# Patient Record
Sex: FEMALE | Race: WHITE | NOT HISPANIC OR LATINO | Employment: FULL TIME | URBAN - METROPOLITAN AREA
[De-identification: names, ages, dates, MRNs, and addresses within clinical notes are randomized per-mention and may not be internally consistent; named-entity substitution may affect disease eponyms.]

---

## 2018-02-09 ENCOUNTER — OFFICE VISIT CONVERTED (OUTPATIENT)
Dept: PULMONOLOGY | Facility: CLINIC | Age: 28
End: 2018-02-09
Attending: INTERNAL MEDICINE

## 2018-08-30 ENCOUNTER — OFFICE VISIT CONVERTED (OUTPATIENT)
Dept: PULMONOLOGY | Facility: CLINIC | Age: 28
End: 2018-08-30
Attending: PHYSICIAN ASSISTANT

## 2018-09-20 ENCOUNTER — OFFICE VISIT CONVERTED (OUTPATIENT)
Dept: PULMONOLOGY | Facility: CLINIC | Age: 28
End: 2018-09-20
Attending: INTERNAL MEDICINE

## 2019-01-17 ENCOUNTER — OFFICE VISIT CONVERTED (OUTPATIENT)
Dept: PULMONOLOGY | Facility: CLINIC | Age: 29
End: 2019-01-17
Attending: INTERNAL MEDICINE

## 2021-05-28 VITALS
HEART RATE: 104 BPM | OXYGEN SATURATION: 97 % | OXYGEN SATURATION: 100 % | DIASTOLIC BLOOD PRESSURE: 80 MMHG | HEIGHT: 66 IN | TEMPERATURE: 98.4 F | HEIGHT: 66 IN | RESPIRATION RATE: 12 BRPM | WEIGHT: 172.56 LBS | BODY MASS INDEX: 26.57 KG/M2 | BODY MASS INDEX: 27.32 KG/M2 | TEMPERATURE: 98.3 F | WEIGHT: 170 LBS | WEIGHT: 165.31 LBS | RESPIRATION RATE: 18 BRPM | DIASTOLIC BLOOD PRESSURE: 82 MMHG | SYSTOLIC BLOOD PRESSURE: 132 MMHG | HEART RATE: 87 BPM | RESPIRATION RATE: 14 BRPM | HEART RATE: 94 BPM | SYSTOLIC BLOOD PRESSURE: 120 MMHG | TEMPERATURE: 98.3 F | HEIGHT: 66 IN | DIASTOLIC BLOOD PRESSURE: 75 MMHG | BODY MASS INDEX: 27.73 KG/M2 | OXYGEN SATURATION: 100 % | SYSTOLIC BLOOD PRESSURE: 111 MMHG

## 2021-05-28 VITALS
DIASTOLIC BLOOD PRESSURE: 80 MMHG | HEART RATE: 98 BPM | RESPIRATION RATE: 16 BRPM | OXYGEN SATURATION: 100 % | SYSTOLIC BLOOD PRESSURE: 124 MMHG | WEIGHT: 166.12 LBS | HEIGHT: 66 IN | TEMPERATURE: 98.5 F | BODY MASS INDEX: 26.7 KG/M2

## 2021-05-28 NOTE — PROGRESS NOTES
Patient: RBO SEYMOUR     Acct: [id number]     Report: #[id number]  UNIT #: [id number]     : 1990    Encounter Date:2018  PRIMARY CARE: ANA ALTMAN  ***Signed***  --------------------------------------------------------------------------------------------------------------------  Chief Complaint      Encounter Date      2018            Referring Provider      ANA ALTMAN            Patient Complaint      Patient is complaining of      Pt here for 2m f/u and results            VITALS      Height 5 ft 6 in / 167.64 cm      Weight 165 lbs 5 oz / 74.205908 kg      BSA 1.88 m2      BMI 26.7 kg/m2      Temperature 98.3 F / 36.83 C - Oral      Pulse 94      Respirations 14      Blood Pressure 132/82 Sitting, Right Arm      Pulse Oximetry 97%, Room air            HPI      The patient is a very pleasant 27 year old white female with history of POTS     and asthma here today for follow up.             The patient was started on Symbicort at her last office visit and feels this     has significantly helped with her symptoms. The patient no longer needs to use     her rescue inhalers often. She has much less shortness of breath and increased     exercise tolerance. The patient has some dizziness and some heart palpitations     which she attributes to the POT symptoms as well as some fatigue. The patient     has no sleep apnea type symptoms and has a normal [place] sleepiness scale     score. The patient still has some shortness of breath worse with exertion,     worse with certain types of fragrances and some episodic wheezing,  better with     rest.            ROS      Constitutional:  Denies: Fatigue, Fever, Weight gain, Weight loss, Chills,     Insomnia, Other      Respiratory/Breathing:  Complains of: Shortness of air, Denies: Wheezing, Cough    , Hemoptysis, Pleuritic pain, Other      Endocrine:  Denies: Polydipsia, Polyuria, Heat/cold intolerance, Abnorml     menstrual pattern,  Diabetes, Other      Eyes:  Denies: Blurred vision, Vision Changes, Other      Ears, nose, mouth, throat:  Denies: Mouth lesions, Thrush, Throat pain,     Hoarseness, Allergies/Hay Fever, Post Nasal Drip, Headaches, Recent Head Injury    , Nose Bleeding, Neck Stiffness, Thyroid Mass, Hearing Loss, Ear Fullness, Dry     Mouth, Nasal or Sinus Pain, Dry Lips, Nasal discharge, Nasal congestion, Other      Cardiovascular:  Denies: Palpitations, Syncope, Claudication, Chest Pain, Wake     up Gasping for air, Leg Swelling, Irregular Heart Rate, Cyanosis, Dyspnea on     Exertion, Other      Gastrointestinal:  Denies: Nausea, Constipation, Diarrhea, Abdominal pain,     Vomiting, Difficulty Swallowing, Reflux/Heartburn, Dysphagia, Jaundice, Bloating    , Melena, Bloody stools, Other      Genitourinary:  Denies: Urinary frequency, Incontinence, Hematuria, Urgency,     Nocturia, Dysuria, Testicular problems, Other      Musculoskeletal:  Denies: Joint Pain, Joint Stiffness, Joint Swelling, Myalgias    , Other      Hematologic/lymphatic:  DENIES: Lymphadenopathy, Bruising, Bleeding tendencies,     Other      Neurological:  Denies: Headache, Numbness, Weakness, Seizures, Other      Psychiatric:  Denies: Anxiety, Appropriate Effect, Depression, Other      Sleep:  No: Excessive daytime sleep, Morning Headache?, Snoring, Insomnia?,     Stop breathing at sleep?, Other      Integumentary:  Denies: Rash, Dry skin, Skin Warm to Touch, Other      Immunologic/Allergic:  Denies: Latex allergy, Seasonal allergies, Asthma,     Urticaria, Eczema, Other      Immunization status:  No: Up to date            FAMILY/SOCIAL/MEDICAL HX      Surgical History:  Yes: Abdominal Surgery (HERNIA REPAIR INGUINAL), No: AAA     Repair, Angioplasty, Appendectomy, Back Surgery, Bladder Surgery, Bowel Surgery    , Breast Surgery, CABG, Carotid Stenosis, Cholecystectomy, Ear Surgery, Eye     Surgery, Head Surgery, Hernia Surgery, Kidney Surgery, Nose Surgery,  Oral     Surgery, Orthopedic Surgery, Prostatectomy, Rectal Surgery, Spinal Surgery,     Testicular Surgery, Throat Surgery, Valve Replacement, Vascular Surgery, Other     Surgeries      Heart - Family Hx:  Grandparent      Cancer/Type - Family Hx:  Grandparent      Is Father Still Living?:  Yes      Is Mother Still Living?:  Yes      Social History:  No Tobacco Use, No Alcohol Use, No Recreational Drug use      Smoking status:  Never smoker      Anticoagulation Therapy:  No      Antibiotic Prophylaxis:  No      Medical History:  No: Alcoholism, Allergies, Anemia, Arthritis, Asthma, Blood     Disease, Broken Bones, Cataracts, Chemical Dependency, Chemotherapy/Cancer,     Chronic Bronchitis/COPD, Emphysema, Chronic Liver Disease, Colon Trouble,     Colitis, Diverticulitis, Congestive Heart Failu, Deafness or Ringing Ears,     Convulsions, Depression, Anxiety, Bipolar Disorder, Diabetes, Epilepsy, Seizures    , Forgetfullness, Glaucoma, Gall Stones, Gout, Head Injury, Heart Attack, Heart     Murmur, Hemorrhoids/Rectal Prob, Hepatitis, Hiatal Hernia, High Blood Pressure,     High Cholesterol, HIV (Do not ask - volu, Jaundice, Kidney or Bladder Disease,     Kidney Stones, Migrane Headaches, Mitral Valve Prolapse, Night sweats, Phlebitis    , Psychiatric Care, Reflux Disease, Rheumatic Fever, Sexually Transmitted Dis,     Shortness Of Breath, Sinus Trouble, Skin Disease/Psoriais/Ecz, Stroke, Thyroid     Problem, Tuberculosis or Pos TB Te, Miscellaneous Medical/oth            Hx Influenza Vaccination:  No      Influenza Vaccine Declined:  Yes      2 or More Falls Past Year?:  No      Fall Past Year with Injury?:  No      Hx Pneumococcal Vaccination:  No      Encouraged to follow-up with:  PCP regarding preventative exams.      Chart initiated by      Saundra De La Torre MA            ALLERGIES/MEDICATIONS      Allergies:        Coded Allergies:             NO KNOWN ALLERGIES (Unverified , 2/9/18)      Medications    Last  Reconciled on 2/9/18 15:53 by HECTOR BRIGGS MD      Budesonide/Formoterol Fumarate (Symbicort 160/4.5 Mcg) 10.2 Gm Inh      2 PUFF INH RTBID, #1 INH 0 Refills         Reported         2/9/18       Fluticasone Furoate (Arnuity Ellipta) 100 Mcg Blst.w.dev      1 PUFF INH RTQDAY, #1 INH         Reported         7/20/17       verapamil Hcl (verapamil*) 80 Mg Tab      40 MG PO QDAY, #45 TAB 0 Refills         Reported         5/18/17       Montelukast Sodium (Singulair*) 10 Mg Tablet      10 MG PO QDAY, #30 TAB 0 Refills         Reported         1/17/17       (Magnesium)   No Conflict Check      400 MG PO QAM         Reported         1/23/15       Multivitamins (Multiple Vitamin) 1 Tab Tablet      1 TAB PO QDAY, #30 TAB 0 Refills         Reported         1/23/15      Current Medications      Current Medications Reviewed 2/9/18            EXAM      GEN-patient appears stated age resting comfortable in no acute distress      Eyes-PERRL,  conjunctiva are normal in appearance extraocular muscles are intact    , no scleral icterus      Lymphatic-no swollen or enlarged cervical nodes, or axillary node, or femoral     nodes, or supraclavicular nodes      Mouth normal dentition, no erythema no ulcerations oropharynx appears normal no     exudate no evidence of postnasal drip,       Neck-there are no palpable supraclavicular or cervical adenopathy, thyroid is     normal in appearance no apparent nodules, there is no inspiratory or expiratory     stridor      Respiratory-patient exhibits normal work of breathing, speaking in full     sentences without difficulty, the chest is normal in appearance, clear to     auscultation with no wheezes rales or rhonchi, chest is normal to percussion on     both the right and left sides      Cardiovascular-the heart rate is normal and regular S1 and S2 present with no     murmur or extra heart sounds, there is no JVD or pedal edema present      GI-the abdomen is normal in appearance, bowel  sounds present and normal in all     quadrants no hepatosplenomegaly or masses felt      Extremities-no clubbing is present, pulses present in all extremities,     capillary refill time is normal      Skin-skin is normal in appearance it is warm and dry, no rashes present, no     evidence of cyanosis, palpation reveals no masses      Neurological-the patient is alert and oriented to time place and person, moves     all 4 extremities, normal gait, normal affect and mood, CN2-12 intact      Psych-normal judgment and insight is good, normal mood and affect, alert and     oriented to person, place, and time, and date      Vtials      Vitals:             Height 5 ft 6 in / 167.64 cm           Weight 165 lbs 5 oz / 74.348780 kg           BSA 1.88 m2           BMI 26.7 kg/m2           Temperature 98.3 F / 36.83 C - Oral           Pulse 94           Respirations 14           Blood Pressure 132/82 Sitting, Right Arm           Pulse Oximetry 97%, Room air            REVIEW      Results Reviewed      PCCS Results Reviewed?:  Yes Prev Lab Results, Yes Prev Radiology Results, Yes     Previous [facility] Records            PLAN      Assessment      Notes      New Medications      * TIOTROPIUM BROMIDE (Spiriva Respimat 1.25 mcg/puff) 4 GM MIST.INHAL: 2 PUFFS     INH QDAY #1      * Budesonide/Formoterol Fumarate (Symbicort 160/4.5 Mcg) 10.2 GM INH: 2 PUFF     INH BID #1      * Fluticasone/Vilanterol 200-25 Mcg Inh (Breo Ellipta 200-25 Mcg Inh) 1 EACH     BLST.W.DEV: 1 PUFF INH QDAY #1       Instructions: TO REPLACE SYMBICORT      ASSESSMENT/PLAN:      1. Moderate persistent asthma. Continue Symbicort 160/4.5 two puffs twice daily     with a spacer.       2. Continue PRN albuterol. Symptoms are much improved however still present. We     will add Spiriva 1.25 mcg.       3. Pulmonary hypertension. Not present on repeat echocardiogram.       4. Heart palpitations. Followed by cardiology.       5. Allergic rhinitis. Continue Singulair.        6. I have personally reviewed laboratory data, imaging as well as previous     medical records.            Patient Education      Education resources provided:  Yes      Patient Education Provided:  Acute Asthma                 Disclaimer: Converted document may not contain table formatting or lab diagrams. Please see [email or web address] for the authenticated document.

## 2021-05-28 NOTE — PROGRESS NOTES
Patient: BRO SEYMOUR     Acct: [id number]     Report: #[id number]  UNIT #: [id number]     : 1990    Encounter Date:2019  PRIMARY CARE: ANA ALTMAN  ***Signed***  --------------------------------------------------------------------------------------------------------------------  Chief Complaint      Encounter Date      2019            Primary Care Provider      ANA ALTMAN            Referring Provider      ANA ALTMAN            Patient Complaint      Patient is complaining of      4 MONTH FOLLOW UP/SOA            VITALS      Height 5 ft 6 in / 167.64 cm      Weight 172 lbs 9 oz / 78.409582 kg      BSA 1.88 m2      BMI 27.9 kg/m2      Temperature 98.4 F / 36.89 C - Oral      Pulse 104      Respirations 18      Blood Pressure 120/80 Sitting, Right Arm      Pulse Oximetry 100%, room air            HPI      The patient is a very pleasant 28 year old female with history of asthma here     for follow up.             The patient is doing well at this time. She feels that the Symbicort and Spiriva    are helping. She uses the Spiriva only during her menstrual cycle. She has no     increased cough and no increased sputum production and feels that her breathing     is overall at her baseline.            ROS      Constitutional:  Complains of: Fatigue; Denies: Fever, Weight gain, Weight loss,    Chills, Insomnia, Other      Respiratory/Breathing:  Denies: Shortness of air, Wheezing, Cough, Hemoptysis,     Pleuritic pain, Other      Endocrine:  Denies: Polydipsia, Polyuria, Heat/cold intolerance, Abnorml     menstrual pattern, Diabetes, Other      Eyes:  Denies: Blurred vision, Vision Changes, Other      Ears, nose, mouth, throat:  Denies: Mouth lesions, Thrush, Throat pain,     Hoarseness, Allergies/Hay Fever, Post Nasal Drip, Headaches, Recent Head Injury,    Nose Bleeding, Neck Stiffness, Thyroid Mass, Hearing Loss, Ear Fullness, Dry     Mouth, Nasal or Sinus Pain, Dry Lips, Nasal  discharge, Nasal congestion, Other      Cardiovascular:  Denies: Palpitations, Syncope, Claudication, Chest Pain, Wake     up Gasping for air, Leg Swelling, Irregular Heart Rate, Cyanosis, Dyspnea on     Exertion, Other      Gastrointestinal:  Denies: Nausea, Constipation, Diarrhea, Abdominal pain,     Vomiting, Difficulty Swallowing, Reflux/Heartburn, Dysphagia, Jaundice,     Bloating, Melena, Bloody stools, Other      Genitourinary:  Denies: Urinary frequency, Incontinence, Hematuria, Urgency,     Nocturia, Dysuria, Testicular problems, Other      Musculoskeletal:  Denies: Joint Pain, Joint Stiffness, Joint Swelling, Myalgias,    Other      Hematologic/lymphatic:  DENIES: Lymphadenopathy, Bruising, Bleeding tendencies,     Other      Neurological:  Denies: Headache, Numbness, Weakness, Seizures, Other      Psychiatric:  Denies: Anxiety, Appropriate Effect, Depression, Other      Sleep:  No: Excessive daytime sleep, Morning Headache?, Snoring, Insomnia?, Stop    breathing at sleep?, Other      Integumentary:  Denies: Rash, Dry skin, Skin Warm to Touch, Other      Immunologic/Allergic:  Denies: Latex allergy, Seasonal allergies, Asthma,     Urticaria, Eczema, Other      Immunization status:  No: Up to date            FAMILY/SOCIAL/MEDICAL HX      Surgical History:  Yes: Abdominal Surgery (HERNIA REPAIR INGUINAL); No: AAA     Repair, Angioplasty, Appendectomy, Back Surgery, Bladder Surgery, Bowel Surgery,    Breast Surgery, CABG, Carotid Stenosis, Cholecystectomy, Ear Surgery, Eye     Surgery, Head Surgery, Hernia Surgery, Kidney Surgery, Nose Surgery, Oral     Surgery, Orthopedic Surgery, Prostatectomy, Rectal Surgery, Spinal Surgery,     Testicular Surgery, Throat Surgery, Valve Replacement, Vascular Surgery, Other     Surgeries      Heart - Family Hx:  Grandparent      Cancer/Type - Family Hx:  Grandparent      Is Father Still Living?:  Yes      Is Mother Still Living?:  Yes       Family History:  Yes      Social  History:  No Tobacco Use, No Alcohol Use, No Recreational Drug use      Smoking status:  Never smoker      Anticoagulation Therapy:  No      Antibiotic Prophylaxis:  No      Medical History:  Yes: Anxiety; No: Alcoholism, Allergies, Anemia, Arthritis,     Asthma, Blood Disease, Broken Bones, Cataracts, Chemical Dependency,     Chemotherapy/Cancer, Chronic Bronchitis/COPD, Emphysema, Chronic Liver Disease,     Colon Trouble, Colitis, Diverticulitis, Congestive Heart Failu, Deafness or     Ringing Ears, Convulsions, Depression, Bipolar Disorder, Diabetes, Epilepsy,     Seizures, Forgetfullness, Glaucoma, Gall Stones, Gout, Head Injury, Heart     Attack, Heart Murmur, Hemorrhoids/Rectal Prob, Hepatitis, Hiatal Hernia, High     Blood Pressure, High Cholesterol, HIV (Do not ask - volu, Jaundice, Kidney or     Bladder Disease, Kidney Stones, Migrane Headaches, Mitral Valve Prolapse, Night     sweats, Phlebitis, Psychiatric Care, Reflux Disease, Rheumatic Fever, Sexually     Transmitted Dis, Shortness Of Breath, Sinus Trouble, Skin Disease/Psoriais/Ecz,     Stroke, Thyroid Problem, Tuberculosis or Pos TB Te, Miscellaneous Medical/oth      Psychiatric History      anxiety            PREVENTION      Hx Influenza Vaccination:  No      Influenza Vaccine Declined:  Yes      2 or More Falls Past Year?:  No      Fall Past Year with Injury?:  No      Hx Pneumococcal Vaccination:  No      Encouraged to follow-up with:  PCP regarding preventative exams.      Chart initiated by      GELY LOREDO MA            ALLERGIES/MEDICATIONS      Allergies:        Coded Allergies:             NO KNOWN ALLERGIES (Unverified , 1/17/19)      Medications    Last Reconciled on 1/17/19 11:13 by HECTOR BRIGGS MD      Citalopram HBr (CeleXA) 10 Mg Tablet      10 MG PO QDAY, #30 TAB 0 Refills         Reported         8/30/18       Budesonide/Formoterol Fumarate (Symbicort 160/4.5 Mcg) 10.2 Gm Inh      2 PUFF INH RTBID, #1 INH 0 Refills          Reported         8/30/18       Albuterol (Proair HFA) 8.5 Gm Inh      2 PUFFS INH RTQ6H for 30 Days, #1 INH 3 Refills         Prov: Luis Roland         6/29/18       verapamil Hcl (verapamil*) 80 Mg Tab      40 MG PO QDAY, #45 TAB 0 Refills         Reported         5/18/17       Montelukast Sodium (Singulair*) 10 Mg Tablet      10 MG PO QDAY, #30 TAB 0 Refills         Reported         1/17/17       (Magnesium)   No Conflict Check      400 MG PO QAM         Reported         1/23/15       Multivitamins (Multiple Vitamin) 1 Tab Tablet      1 TAB PO QDAY, #30 TAB 0 Refills         Reported         1/23/15      Current Medications      Current Medications Reviewed 1/17/19            EXAM      GEN-patient appears stated age resting comfortable in no acute distress      Eyes-PERRL,  conjunctiva are normal in appearance extraocular muscles are     intact, no scleral icterus      Lymphatic-no swollen or enlarged cervical nodes, or axillary node, or femoral     nodes, or supraclavicular nodes      Mouth normal dentition, no erythema no ulcerations oropharynx appears normal no     exudate no evidence of postnasal drip,       Neck-there are no palpable supraclavicular or cervical adenopathy, thyroid is     normal in appearance no apparent nodules, there is no inspiratory or expiratory     stridor      Respiratory-patient exhibits normal work of breathing, speaking in full     sentences without difficulty, the chest is normal in appearance, clear to     auscultation with no wheezes rales or rhonchi, chest is normal to percussion on     both the right and left sides      Cardiovascular-the heart rate is normal and regular S1 and S2 present with no     murmur or extra heart sounds, there is no JVD or pedal edema present      GI-the abdomen is normal in appearance, bowel sounds present and normal in all     quadrants no hepatosplenomegaly or masses felt      Extremities-no clubbing is present, pulses present in all extremities,  capillary     refill time is normal      Skin-skin is normal in appearance it is warm and dry, no rashes present, no     evidence of cyanosis, palpation reveals no masses      Neurological-the patient is alert and oriented to time place and person, moves     all 4 extremities, normal gait, normal affect and mood, CN2-12 intact      Psych-normal judgment and insight is good, normal mood and affect, alert and     oriented to person, place, and time, and date      Vtials      Vitals:             Height 5 ft 6 in / 167.64 cm           Weight 172 lbs 9 oz / 78.506064 kg           BSA 1.88 m2           BMI 27.9 kg/m2           Temperature 98.4 F / 36.89 C - Oral           Pulse 104           Respirations 18           Blood Pressure 120/80 Sitting, Right Arm           Pulse Oximetry 100%, room air            REVIEW      Results Reviewed      PCCS Results Reviewed?:  Yes Prev Lab Results, Yes Prev Radiology Results, Yes     Previous [facility] Records            Assessment      ASSESSMENT/PLAN:      1. Moderate persistent asthma. Worse during menstrual cycle. Continue current     medications of Symbicort and Qvar during her cycle along with Spiriva.       2. The patient politely declines flu vaccine.       3. We will see the patient back in 6 months.       4. I have personally reviewed laboratory data, imaging as well as previous     medical records..            Patient Education      Education resources provided:  Yes      Patient Education Provided:  Acute Asthma                 Disclaimer: Converted document may not contain table formatting or lab diagrams. Please see "[employer] System for the authenticated document.

## 2021-05-28 NOTE — PROGRESS NOTES
Patient: BRO SEYMOUR     Acct: [id number]     Report: #[id number]  UNIT #: [id number]     : 1990    Encounter Date:2018  PRIMARY CARE: ANA ALTMAN  ***Signed***  --------------------------------------------------------------------------------------------------------------------  Chief Complaint      Encounter Date      Sep 20, 2018            Primary Care Provider      ANA ALTMAN            Referring Provider      AAN ALTMAN            Patient Complaint      Patient is complaining of      Pt here for 6m f/u, Moderate persistent asthma            VITALS      Height 5 ft 6 in / 167.64 cm      Weight 170 lbs 0 oz / 77.196750 kg      BSA 1.91 m2      BMI 27.4 kg/m2      Temperature 98.3 F / 36.83 C - Oral      Pulse 87      Respirations 12      Blood Pressure 111/75 Sitting, Left Arm      Pulse Oximetry 100%, Room air            HPI      The patient is a very pleasant 28-year-old white female with history of asthma     here today for followup. The patient is doing well at this time, still has     worsening of her symptoms of asthma when she is on her menstrual cycle. She has     some chest tightness and some mild chest discomfort. Recent CT scan of the chest    failed to show any endometrial implants in her chest; however, she does have     history of endometriosis. The patient feels that her asthma is very well     controlled on Symbicort when she is not menstruating. She uses her rescue     inhaler very infrequently when she is not menstruating.            ROS      Constitutional:  Denies: Fatigue, Fever, Weight gain, Weight loss, Chills,     Insomnia, Other      Respiratory/Breathing:  Denies: Shortness of air, Wheezing, Cough, Hemoptysis,     Pleuritic pain, Other      Endocrine:  Denies: Polydipsia, Polyuria, Heat/cold intolerance, Abnorml     menstrual pattern, Diabetes, Other      Eyes:  Denies: Blurred vision, Vision Changes, Other      Ears, nose, mouth, throat:  Denies:  Mouth lesions, Thrush, Throat pain, H    oarseness, Allergies/Hay Fever, Post Nasal Drip, Headaches, Recent Head Injury,     Nose Bleeding, Neck Stiffness, Thyroid Mass, Hearing Loss, Ear Fullness, Dry     Mouth, Nasal or Sinus Pain, Dry Lips, Nasal discharge, Nasal congestion, Other      Cardiovascular:  Denies: Palpitations, Syncope, Claudication, Chest Pain, Wake     up Gasping for air, Leg Swelling, Irregular Heart Rate, Cyanosis, Dyspnea on     Exertion, Other      Gastrointestinal:  Denies: Nausea, Constipation, Diarrhea, Abdominal pain,     Vomiting, Difficulty Swallowing, Reflux/Heartburn, Dysphagia, Jaundice,     Bloating, Melena, Bloody stools, Other      Genitourinary:  Denies: Urinary frequency, Incontinence, Hematuria, Urgency,     Nocturia, Dysuria, Testicular problems, Other      Musculoskeletal:  Denies: Joint Pain, Joint Stiffness, Joint Swelling, Myalgias,    Other      Hematologic/lymphatic:  DENIES: Lymphadenopathy, Bruising, Bleeding tendencies,     Other      Neurological:  Denies: Headache, Numbness, Weakness, Seizures, Other      Psychiatric:  Denies: Anxiety, Appropriate Effect, Depression, Other      Sleep:  No: Excessive daytime sleep, Morning Headache?, Snoring, Insomnia?, Stop    breathing at sleep?, Other      Integumentary:  Denies: Rash, Dry skin, Skin Warm to Touch, Other      Immunologic/Allergic:  Complains of: Asthma; Denies: Latex allergy, Seasonal     allergies, Urticaria, Eczema, Other      Immunization status:  No: Up to date            FAMILY/SOCIAL/MEDICAL HX      Surgical History:  Yes: Abdominal Surgery (HERNIA REPAIR INGUINAL); No: AAA     Repair, Angioplasty, Appendectomy, Back Surgery, Bladder Surgery, Bowel Surgery,    Breast Surgery, CABG, Carotid Stenosis, Cholecystectomy, Ear Surgery, Eye     Surgery, Head Surgery, Hernia Surgery, Kidney Surgery, Nose Surgery, Oral     Surgery, Orthopedic Surgery, Prostatectomy, Rectal Surgery, Spinal Surgery,     Testicular  Surgery, Throat Surgery, Valve Replacement, Vascular Surgery, Other     Surgeries      Heart - Family Hx:  Grandparent      Cancer/Type - Family Hx:  Grandparent      Is Father Still Living?:  Yes      Is Mother Still Living?:  Yes       Family History:  Yes      Social History:  No Tobacco Use, No Alcohol Use, No Recreational Drug use      Smoking status:  Never smoker      Anticoagulation Therapy:  No      Antibiotic Prophylaxis:  No      Medical History:  Yes: Anxiety; No: Alcoholism, Allergies, Anemia, Arthritis,     Asthma, Blood Disease, Broken Bones, Cataracts, Chemical Dependency,     Chemotherapy/Cancer, Chronic Bronchitis/COPD, Emphysema, Chronic Liver Disease,     Colon Trouble, Colitis, Diverticulitis, Congestive Heart Failu, Deafness or     Ringing Ears, Convulsions, Depression, Bipolar Disorder, Diabetes, Epilepsy,     Seizures, Forgetfullness, Glaucoma, Gall Stones, Gout, Head Injury, Heart     Attack, Heart Murmur, Hemorrhoids/Rectal Prob, Hepatitis, Hiatal Hernia, High     Blood Pressure, High Cholesterol, HIV (Do not ask - volu, Jaundice, Kidney or     Bladder Disease, Kidney Stones, Migrane Headaches, Mitral Valve Prolapse, Night     sweats, Phlebitis, Psychiatric Care, Reflux Disease, Rheumatic Fever, Sexually     Transmitted Dis, Shortness Of Breath, Sinus Trouble, Skin Disease/Psoriais/Ecz,     Stroke, Thyroid Problem, Tuberculosis or Pos TB Te, Miscellaneous Medical/oth      Psychiatric History      Anxiety            PREVENTION      Hx Influenza Vaccination:  No      Influenza Vaccine Declined:  Yes      2 or More Falls Past Year?:  No      Fall Past Year with Injury?:  No      Hx Pneumococcal Vaccination:  No      Encouraged to follow-up with:  PCP regarding preventative exams.      Chart initiated by      Saundra Florian MA            ALLERGIES/MEDICATIONS      Allergies:        Coded Allergies:             NO KNOWN ALLERGIES (Unverified , 9/20/18)      Medications    Last Reconciled on  9/20/18 08:43 by HECTOR ROLAND MD      Citalopram HBr (CeleXA) 10 Mg Tablet      10 MG PO QDAY, #30 TAB 0 Refills         Reported         8/30/18       Budesonide/Formoterol Fumarate (Symbicort 160/4.5 Mcg) 10.2 Gm Inh      2 PUFF INH RTBID, #1 INH 0 Refills         Reported         8/30/18       Albuterol (Proair HFA*) 8.5 Gm Inh      2 PUFFS INH RTQ6H for 30 Days, #1 INH 3 Refills         Prov: Hector Roland         6/29/18       verapamil Hcl (verapamil*) 80 Mg Tab      40 MG PO QDAY, #45 TAB 0 Refills         Reported         5/18/17       Montelukast Sodium (Singulair*) 10 Mg Tablet      10 MG PO QDAY, #30 TAB 0 Refills         Reported         1/17/17       (Magnesium)   No Conflict Check      400 MG PO QAM         Reported         1/23/15       Multivitamins (Multiple Vitamin) 1 Tab Tablet      1 TAB PO QDAY, #30 TAB 0 Refills         Reported         1/23/15      Current Medications      Current Medications Reviewed 9/20/18            EXAM      GEN-patient appears stated age resting comfortable in no acute distress      Eyes-PERRL,  conjunctiva are normal in appearance extraocular muscles are     intact, no scleral icterus      Lymphatic-no swollen or enlarged cervical nodes, or axillary node, or femoral     nodes, or supraclavicular nodes      Mouth normal dentition, no erythema no ulcerations oropharynx appears normal no     exudate no evidence of postnasal drip,       Neck-there are no palpable supraclavicular or cervical adenopathy, thyroid is     normal in appearance no apparent nodules, there is no inspiratory or expiratory     stridor      Respiratory-patient exhibits normal work of breathing, speaking in full     sentences without difficulty, the chest is normal in appearance, clear to     auscultation with no wheezes rales or rhonchi, chest is normal to percussion on     both the right and left sides      Cardiovascular-the heart rate is normal and regular S1 and S2 present with no     murmur  or extra heart sounds, there is no JVD or pedal edema present      GI-the abdomen is normal in appearance, bowel sounds present and normal in all     quadrants no hepatosplenomegaly or masses felt      Extremities-no clubbing is present, pulses present in all extremities, capillary     refill time is normal      Skin-skin is normal in appearance it is warm and dry, no rashes present, no     evidence of cyanosis, palpation reveals no masses      Neurological-the patient is alert and oriented to time place and person, moves     all 4 extremities, normal gait, normal affect and mood, CN2-12 intact      Psych-normal judgment and insight is good, normal mood and affect, alert and     oriented to person, place, and time, and date      Vtials      Vitals:             Height 5 ft 6 in / 167.64 cm           Weight 170 lbs 0 oz / 77.900838 kg           BSA 1.91 m2           BMI 27.4 kg/m2           Temperature 98.3 F / 36.83 C - Oral           Pulse 87           Respirations 12           Blood Pressure 111/75 Sitting, Left Arm           Pulse Oximetry 100%, Room air            REVIEW      Results Reviewed      [facility] Results Reviewed?:  Yes Prev Lab Results, Yes Prev Radiology Results, Yes     Previous [facility] Records            Assessment      Notes      Discontinued Medications      * Fluticasone/Vilanterol 200-25 Mcg Inh (Breo Ellipta 200-25 Mcg Inh) 1 EACH       BLST.W.DEV: 1 PUFF INH QDAY #1         Instructions: TO REPLACE SYMBICORT      ASSESSMENT:      1. Moderate persistent asthma, worse during menstrual cycle.      2. Dyspnea.      3. Pleuritic chest pain with her menstrual cycle.            PLAN:       1. We will add cyclic Qvar to be taken a few days before her cycle and through     her cycle in conjunction with her Symbicort.      2. The patient at this time does not wish to try oral contraceptives. I have     explained to her that oral contraceptives may be beneficial in controlling her     symptoms.      3. It  does not appear that the patient has any obvious endometrial implants on     CT scan of the chest; however, the scan was not obtained during her menstrual     cycle and sometimes endometrial implants can be very subtle.      4. The patient declines flu vaccine.       5. I have personally reviewed laboratory data, imaging as well as previous     medical records.            Patient Education      Education resources provided:  Yes      Patient Education Provided:  Acute Asthma                 Disclaimer: Converted document may not contain table formatting or lab diagrams. Please see [employer] for the authenticated document.

## 2021-05-28 NOTE — PROGRESS NOTES
Patient: BRO SEYMOUR     Acct: [id number]     Report: #[id number]  UNIT #: [id number]     : 1990    Encounter Date:2018  PRIMARY CARE: ANA ALTMAN  ***Signed***  --------------------------------------------------------------------------------------------------------------------  Chief Complaint      Encounter Date      Aug 30, 2018            Primary Care Provider      ANA ALTMAN            Referring Provider      ANA ALTMAN            Patient Complaint      Patient is complaining of      left lung pain            VITALS      Height 5 ft 6 in / 167.64 cm      Weight 166 lbs 2 oz / 75.551572 kg      BSA 1.89 m2      BMI 26.8 kg/m2      Temperature 98.5 F / 36.94 C - Oral      Pulse 98      Respirations 16      Blood Pressure 124/80 Sitting, Right Arm      Pulse Oximetry 100%, room air            HPI      The patient is a very pleasant 28 year old white female who is a patient of Dr. Lira.  She has a history of mild persistent asthma as well as POTS.  She is    here today complaining of symptoms of pleuritic type chest pain in the middle of    her chest and underneath her left breast associated with dyspnea and a feeling      of not being able to take a full breath out that has been ongoing typically for     several days a month during her menstrual cycle for at least six months to one     year now. She tells me that the symptoms have worsened in the past several     months and that the symptoms this month that are ongoing currently are worse     than they have ever been before. She denies any coughing, hemoptysis, fever,     chills or any purulent sputum production.  She is still using her Symbicort 160     and has tried that along with her Ventolin rescue inhaler for these symptoms     with no relief. She denies any orthopnea, lower extremity edema or weight gain.     She does have a history of stage 4 endometriosis and has actually had to have     three surgeries for  endometrial tissue removal.  As far as she knows she has had    endometriosis throughout her pelvis and on her ovaries in the past.  She tells     me that she recently had a colonoscopy secondary to some spasm type abdominal     pain, but was told that she did not appear to have endometrial tissue on her     colon at that time.  She currently is not on oral contraceptives for her     endometriosis as she had migraines associated with those in the past. Her     symptoms are severe enough that she wants to stay in bed all day when they     happen and are severely impacting her quality of life.  The symptoms tend to     occur on the first day of her menstrual period and last for 3-5 days.            I have reviewed her ROS, medical, surgical and family history and agree with     those as entered in the chart.            ROS      Constitutional:  Denies: Fatigue, Fever, Weight gain, Weight loss, Chills,     Insomnia, Other      Respiratory/Breathing:  Complains of: Shortness of air; Denies: Wheezing, Cough,    Hemoptysis, Pleuritic pain, Other      Endocrine:  Denies: Polydipsia, Polyuria, Heat/cold intolerance, Abnorml     menstrual pattern, Diabetes, Other      Eyes:  Denies: Blurred vision, Vision Changes, Other      Ears, nose, mouth, throat:  Denies: Mouth lesions, Thrush, Throat pain,     Hoarseness, Allergies/Hay Fever, Post Nasal Drip, Headaches, Recent Head Injury,    Nose Bleeding, Neck Stiffness, Thyroid Mass, Hearing Loss, Ear Fullness, Dry     Mouth, Nasal or Sinus Pain, Dry Lips, Nasal discharge, Nasal congestion, Other      Cardiovascular:  Denies: Palpitations, Syncope, Claudication, Chest Pain, Wake     up Gasping for air, Leg Swelling, Irregular Heart Rate, Cyanosis, Dyspnea on     Exertion, Other      Gastrointestinal:  Denies: Nausea, Constipation, Diarrhea, Abdominal pain,     Vomiting, Difficulty Swallowing, Reflux/Heartburn, Dysphagia, Jaundice,     Bloating, Melena, Bloody stools, Other       Genitourinary:  Denies: Urinary frequency, Incontinence, Hematuria, Urgency,     Nocturia, Dysuria, Testicular problems, Other      Musculoskeletal:  Denies: Joint Pain, Joint Stiffness, Joint Swelling, Myalgias,    Other      Hematologic/lymphatic:  DENIES: Lymphadenopathy, Bruising, Bleeding tendencies,     Other      Neurological:  Denies: Headache, Numbness, Weakness, Seizures, Other      Psychiatric:  Denies: Anxiety, Appropriate Effect, Depression, Other      Sleep:  No: Excessive daytime sleep, Morning Headache?, Snoring, Insomnia?, Stop    breathing at sleep?, Other      Integumentary:  Denies: Rash, Dry skin, Skin Warm to Touch, Other      Immunologic/Allergic:  Denies: Latex allergy, Seasonal allergies, Asthma,     Urticaria, Eczema, Other      Immunization status:  No: Up to date            FAMILY/SOCIAL/MEDICAL HX      Surgical History:  Yes: Abdominal Surgery (HERNIA REPAIR INGUINAL); No: AAA     Repair, Angioplasty, Appendectomy, Back Surgery, Bladder Surgery, Bowel Surgery,    Breast Surgery, CABG, Carotid Stenosis, Cholecystectomy, Ear Surgery, Eye Surger    y, Head Surgery, Hernia Surgery, Kidney Surgery, Nose Surgery, Oral Surgery,     Orthopedic Surgery, Prostatectomy, Rectal Surgery, Spinal Surgery, Testicular     Surgery, Throat Surgery, Valve Replacement, Vascular Surgery, Other Surgeries      Heart - Family Hx:  Grandparent      Cancer/Type - Family Hx:  Grandparent      Is Father Still Living?:  Yes      Is Mother Still Living?:  Yes       Family History:  Yes      Social History:  No Tobacco Use, No Alcohol Use, No Recreational Drug use      Smoking status:  Never smoker      Anticoagulation Therapy:  No      Antibiotic Prophylaxis:  No      Medical History:  No: Alcoholism, Allergies, Anemia, Arthritis, Asthma, Blood     Disease, Broken Bones, Cataracts, Chemical Dependency, Chemotherapy/Cancer,     Chronic Bronchitis/COPD, Emphysema, Chronic Liver Disease, Colon Trouble,     Colitis,  Diverticulitis, Congestive Heart Failu, Deafness or Ringing Ears,     Convulsions, Depression, Anxiety, Bipolar Disorder, Diabetes, Epilepsy,     Seizures, Forgetfullness, Glaucoma, Gall Stones, Gout, Head Injury, Heart     Attack, Heart Murmur, Hemorrhoids/Rectal Prob, Hepatitis, Hiatal Hernia, High     Blood Pressure, High Cholesterol, HIV (Do not ask - volu, Jaundice, Kidney or     Bladder Disease, Kidney Stones, Migrane Headaches, Mitral Valve Prolapse, Night     sweats, Phlebitis, Psychiatric Care, Reflux Disease, Rheumatic Fever, Sexually     Transmitted Dis, Shortness Of Breath, Sinus Trouble, Skin Disease/Psoriais/Ecz,     Stroke, Thyroid Problem, Tuberculosis or Pos TB Te, Miscellaneous Medical/oth      Psychiatric History      none            PREVENTION      Hx Influenza Vaccination:  No      Influenza Vaccine Declined:  Yes      2 or More Falls Past Year?:  No      Fall Past Year with Injury?:  No      Hx Pneumococcal Vaccination:  No      Encouraged to follow-up with:  PCP regarding preventative exams.      Chart initiated by      eufemia armando ma            ALLERGIES/MEDICATIONS      Allergies:        Coded Allergies:             NO KNOWN ALLERGIES (Unverified , 8/30/18)      Medications    Last Reconciled on 8/30/18 16:31 by YUNG PENA      Citalopram HBr (CeleXA) 10 Mg Tablet      10 MG PO QDAY, #30 TAB 0 Refills         Reported         8/30/18       Budesonide/Formoterol Fumarate (Symbicort 160/4.5 Mcg) 10.2 Gm Inh      2 PUFF INH RTBID, #1 INH 0 Refills         Reported         8/30/18       Albuterol (Proair HFA*) 8.5 Gm Inh      2 PUFFS INH RTQ6H for 30 Days, #1 INH 3 Refills         Prov: Luis Roland         6/29/18       Fluticasone/Vilanterol 200-25 Mcg Inh (Breo Ellipta 200-25 Mcg Inh) 1 Each     Blst.w.dev      1 PUFF INH QDAY, #1 INH 3 Refills         Prov: Luis Roland         2/15/18       verapamil Hcl (verapamil*) 80 Mg Tab      40 MG PO QDAY, #45 TAB 0 Refills          Reported         5/18/17       Montelukast Sodium (Singulair*) 10 Mg Tablet      10 MG PO QDAY, #30 TAB 0 Refills         Reported         1/17/17       (Magnesium)   No Conflict Check      400 MG PO QAM         Reported         1/23/15       Multivitamins (Multiple Vitamin) 1 Tab Tablet      1 TAB PO QDAY, #30 TAB 0 Refills         Reported         1/23/15      Current Medications      Current Medications Reviewed 8/30/18            EXAM      GEN-patient appears stated age resting comfortable in no acute distress      Eyes-PERRL,  conjunctiva are normal in appearance extraocular muscles are     intact, no scleral icterus      Nasal-both nares are patent turbinates appear normal no polyps seen no nasal     discharge or ulcerations      Ears-tympanic membranes are normal no erythema no bulging, normal to inspection      Lymphatic-no swollen or enlarged cervical nodes, or axillary node, or femoral     nodes, or supraclavicular nodes      Mouth normal dentition, no erythema no ulcerations oropharynx appears normal no     exudate no evidence of postnasal drip, MP(default value)      Neck-there are no palpable supraclavicular or cervical adenopathy, thyroid is     normal in appearance no apparent nodules, there is no inspiratory or expiratory     stridor      Respiratory-Lungs are grossly clear to auscultation.  No wheezes, rhonchi or     crackles appreciated.  Normal work of breathing.        Cardiovascular-the heart rate is normal and regular S1 and S2 present with no     murmur or extra heart sounds, there is no JVD or pedal edema present      GI-the abdomen is normal in appearance, bowel sounds present and normal in all     quadrants no hepatosplenomegaly or masses felt      Extremities-no clubbing is present, pulses present in all extremities, capillary    refill time is normal      Musculoskeletal-Normal strength in upper and lower extremities, inspection shows    no evidence of muscle atrophy      Skin-skin is  normal in appearance it is warm and dry, no rashes present, no     evidence of cyanosis, palpation reveals no masses      Neurological-the patient is alert and oriented to time place and person, moves     all 4 extremities, normal gait, normal affect and mood, CN2-12 intact      Psych-normal judgment and insight is good, normal mood and affect, alert and     oriented to person, place, and time, and date      Vtials      Vitals:             Height 5 ft 6 in / 167.64 cm           Weight 166 lbs 2 oz / 75.337722 kg           BSA 1.89 m2           BMI 26.8 kg/m2           Temperature 98.5 F / 36.94 C - Oral           Pulse 98           Respirations 16           Blood Pressure 124/80 Sitting, Right Arm           Pulse Oximetry 100%, room air            REVIEW      Results Reviewed      PCCS Results Reviewed?:  Yes Prev Lab Results, Yes Prev Radiology Results, Yes     Previous [facility] Records      Lab Results      I personally reviewed previous lab work, imaging and provider notes including     recent ER records from [facility] from three days ago at which     time the patient had a normal chest x-ray, normal EKG and normal blood work     including D-dimer that was negative and negative troponins.  I have also     reviewed previous chest CT that she had done at [facility] in 2017    that was grossly within normal limits.            Assessment      SOB (shortness of breath) - R06.02            Pleuritic chest pain - R07.81            Endometriosis - N80.9            Notes      New Medications      * Budesonide/Formoterol Fumarate (Symbicort 160/4.5 Mcg) 10.2 GM INH: 2 PUFF INH      RTBID #1      * Citalopram HBr (CeleXA) 10 MG TABLET: 10 MG PO QDAY #30      Discontinued Medications      * TIOTROPIUM BROMIDE (Spiriva Respimat 1.25 mcg/puff) 4 GM MIST.INHAL: 2 PUFFS       INH QDAY #1      New Diagnostics      * Chest W/O Cont CT, As Soon As Possible         Dx: SOB (shortness of breath) - R06.02       ASSESSMENT:       1. Pleuritic chest pain and dyspnea associated with patient's menstrual cycle,     concern for endometriosis to the lung or diaphragm.        2. Moderate persistent asthma without acute exacerbation.      3. Pleuritic chest pain.      4.  Dyspnea.      5. POTS syndrome.      6.  Allergic rhinitis.      7. Endometriosis, stage 4.              PLAN:      1.  I would like to have patient to have a repeat chest CT, this time with     contrast as her symptoms of pleuritic chest pain and dyspnea have worsened     significantly that occur during her menstrual cycle over the past 6-12 months si    nce her previous chest CT was done. I would like a chest CT with contrast this     time to see if any endometrial lesions on her lungs can be appreciated.  I have     also requested records from the patient's gynecologist, Dr. Mina in     [place], [place] who has been treating her endometriosis and has done her previous     endometrial surgeries. The patient has already had significant cardiac and     pulmonary workup for these symptoms of her pleuritic chest pain and dyspnea that    occur during her menses and thus far all workup has been negative including     negative chest x-ray, EKG, troponins, D-dimer, CBC and BMP.  She also had a     normal echocardiogram in 12/2017 and is actually scheduled for a repeat     echocardiogram tomorrow. I have asked the patient to have those records sent to     us so we can review them as well.        2.  I will have her continue on Symbicort 160 with two puffs twice a day.  Her     current symptoms do not appear secondary to her asthma.        3. I will review her chest CT with contrast once it is available.  Pending those    results, may need to discuss with the patient and/or gynecologist, Dr. Mina    about considering placing her back on oral contraceptives to better treat her     endometriosis given that she is having significant symptoms that are impacting     her  daily life during her menstrual period.        4.  Follow up in 1-2 months with Dr. Roland and I have also discussed my     concerns with him today. He is in agreement with this plan and these findings as    well as the suspected diagnosis of endometriosis to the lung.            Patient Education      Patient Education Provided:  How to use an Inhaler      Time Spent:  > 50% /[facility]                 Disclaimer: Converted document may not contain table formatting or lab diagrams. Please see [employer] for the authenticated document.

## 2022-06-27 ENCOUNTER — TELEPHONE (OUTPATIENT)
Dept: UROLOGY | Facility: CLINIC | Age: 32
End: 2022-06-27

## 2022-06-27 NOTE — TELEPHONE ENCOUNTER
SPOKE TO DYANA AT ANA ALTMAN'S OFFICE TO INFORM OF NEW PT NO SHOW WITH ELIOT ON 6/27 IN [place]

## 2022-08-12 ENCOUNTER — TELEPHONE (OUTPATIENT)
Dept: UROLOGY | Facility: CLINIC | Age: 32
End: 2022-08-12

## 2022-09-19 ENCOUNTER — OFFICE VISIT (OUTPATIENT)
Dept: UROLOGY | Facility: CLINIC | Age: 32
End: 2022-09-19

## 2022-09-19 VITALS — RESPIRATION RATE: 16 BRPM | BODY MASS INDEX: 22.5 KG/M2 | WEIGHT: 140 LBS | HEIGHT: 66 IN

## 2022-09-19 DIAGNOSIS — N39.0 RECURRENT UTI: Primary | ICD-10-CM

## 2022-09-19 PROBLEM — N76.0 ACUTE VAGINITIS: Status: ACTIVE | Noted: 2022-09-19

## 2022-09-19 PROBLEM — IMO0002 ENDOMETRIOSIS OF PELVIS: Status: ACTIVE | Noted: 2022-09-19

## 2022-09-19 PROBLEM — R06.02 SHORTNESS OF BREATH: Status: ACTIVE | Noted: 2018-08-31

## 2022-09-19 PROBLEM — Q79.62 EHLERS-DANLOS SYNDROME TYPE III: Status: ACTIVE | Noted: 2018-06-22

## 2022-09-19 PROBLEM — D49.7 PITUITARY NEOPLASM: Status: ACTIVE | Noted: 2022-09-19

## 2022-09-19 PROBLEM — R07.9 CHEST PAIN: Status: ACTIVE | Noted: 2018-08-31

## 2022-09-19 PROBLEM — G90.9 DISORDER OF AUTONOMIC NERVOUS SYSTEM: Status: ACTIVE | Noted: 2018-06-22

## 2022-09-19 PROBLEM — Z71.2 ENCOUNTER TO DISCUSS TEST RESULTS: Status: ACTIVE | Noted: 2018-08-31

## 2022-09-19 PROBLEM — B37.31 CANDIDIASIS OF VAGINA: Status: ACTIVE | Noted: 2022-09-19

## 2022-09-19 PROBLEM — R00.0 SINUS TACHYCARDIA: Status: ACTIVE | Noted: 2018-06-22

## 2022-09-19 PROBLEM — R10.11 RIGHT UPPER QUADRANT PAIN: Status: ACTIVE | Noted: 2022-09-19

## 2022-09-19 PROBLEM — K21.9 GASTROESOPHAGEAL REFLUX DISEASE: Status: ACTIVE | Noted: 2022-09-19

## 2022-09-19 PROBLEM — H52.00 HYPERMETROPIA: Status: ACTIVE | Noted: 2022-09-19

## 2022-09-19 PROBLEM — G90.A POSTURAL ORTHOSTATIC TACHYCARDIA SYNDROME: Status: ACTIVE | Noted: 2018-01-18

## 2022-09-19 PROBLEM — R42 DISEQUILIBRIUM: Status: ACTIVE | Noted: 2018-06-22

## 2022-09-19 PROBLEM — I27.20 PULMONARY HYPERTENSION (HCC): Status: ACTIVE | Noted: 2018-08-31

## 2022-09-19 LAB
BILIRUB BLD-MCNC: NEGATIVE MG/DL
CLARITY, POC: CLEAR
COLOR UR: YELLOW
EXPIRATION DATE: NORMAL
GLUCOSE UR STRIP-MCNC: NEGATIVE MG/DL
KETONES UR QL: NEGATIVE
LEUKOCYTE EST, POC: NEGATIVE
Lab: NORMAL
NITRITE UR-MCNC: NEGATIVE MG/ML
PH UR: 7 [PH] (ref 5–8)
PROT UR STRIP-MCNC: NEGATIVE MG/DL
RBC # UR STRIP: NEGATIVE /UL
SP GR UR: 1.01 (ref 1–1.03)
URINE VOLUME: NORMAL
UROBILINOGEN UR QL: NORMAL

## 2022-09-19 PROCEDURE — 51798 US URINE CAPACITY MEASURE: CPT | Performed by: NURSE PRACTITIONER

## 2022-09-19 PROCEDURE — 99204 OFFICE O/P NEW MOD 45 MIN: CPT | Performed by: NURSE PRACTITIONER

## 2022-09-19 PROCEDURE — 87086 URINE CULTURE/COLONY COUNT: CPT | Performed by: NURSE PRACTITIONER

## 2022-09-19 RX ORDER — DOXYCYCLINE HYCLATE 100 MG
TABLET ORAL
COMMUNITY
Start: 2022-09-15 | End: 2022-11-07

## 2022-09-19 RX ORDER — VERAPAMIL HYDROCHLORIDE 40 MG/1
40 TABLET ORAL DAILY
COMMUNITY
Start: 2017-09-18

## 2022-09-19 RX ORDER — CITALOPRAM 10 MG/1
TABLET ORAL
COMMUNITY
Start: 2017-09-18

## 2022-09-19 RX ORDER — TAMSULOSIN HYDROCHLORIDE 0.4 MG/1
1 CAPSULE ORAL DAILY
Qty: 90 CAPSULE | Refills: 3 | Status: SHIPPED | OUTPATIENT
Start: 2022-09-19 | End: 2022-10-19

## 2022-09-19 NOTE — PROGRESS NOTES
Chief Complaint: Recurrent UTI    Subjective         History of Present Illness  Patricia Olea is a 32 y.o. female presents to [facility] to be seen for current UTIs.    She has seen a previous urologist and had a stone removed.     She states that she has a UTI every month around menses.     She states she has dysuria and feeling of incomplete emptying.     Denies straining to void.    She is on doxycyline for  UTI with staph within the last week.     She does have a HX of Bacterial vaginosis.     Not related to intercourse.    She had a CT scan done on 9/14/22 which reveals no  Stones or renal cysts. Large right adnexal 4.7x 4.1 cm simple cyst.     She does state some spraying with urination.     She drinks 48 oz of water a day.     She voids not very often.       Previous:  5/2/2022 no growth    10/24/2021 E. coli greater than 100,000 colony-forming units per milliliter pansensitive.    7/2/2021 no growth.    6/24/2021 E. coli greater than 100,000 colony-forming units per milliliter resistant to ampicillin, levofloxacin, intermittent susceptibility to ampicillin/sulbactam.    5/2/2021 E. coli greater than 100,000 colony-forming units per milliliter distant to ampicillin, intermittent susceptibility to ampicillin/sulbactam.    2/23/2021 E. coli greater than 100,000 colony-forming units per milliliter resistant to ampicillin, intermittent susceptibility to ampicillin/sulbactam.    2/19/2021 no growth.    1/21/2021 E. coli greater than 100,000 colony-forming units per milliliter pansensitive.    Objective     Past Medical History:   Diagnosis Date   • Fibroid     Endometriosis   • HTN (hypertension) 3/4/2015   • Kidney stone 2021   • Pituitary neoplasm 9/19/2022   • Urinary tract infection Long time    Monthly for over 10 years   • Vaginal infection     Bacterial infections       Past Surgical History:   Procedure Laterality Date   • BLADDER SURGERY  Cystoscopy   • HERNIA REPAIR  2013     "Bilateral inguinal   • KIDNEY STONE SURGERY  2021         Current Outpatient Medications:   •  citalopram (CeleXA) 10 MG tablet, citalopram 10 mg tablet, Disp: , Rfl:   •  doxycycline (VIBRAMYICN) 100 MG tablet, , Disp: , Rfl:   •  verapamil (CALAN) 40 MG tablet, verapamil 40 mg tablet, Disp: , Rfl:   •  tamsulosin (FLOMAX) 0.4 MG capsule 24 hr capsule, Take 1 capsule by mouth Daily for 30 days., Disp: 90 capsule, Rfl: 3    Allergies   Allergen Reactions   • Sulfamethoxazole-Trimethoprim Hives        Family History   Problem Relation Age of Onset   • Kidney cancer Maternal Grandfather        Social History     Socioeconomic History   • Marital status: Single   Tobacco Use   • Smoking status: Never Smoker   • Smokeless tobacco: Never Used   Vaping Use   • Vaping Use: Some days   • Substances: Nicotine, Flavoring   Substance and Sexual Activity   • Alcohol use: Yes     Alcohol/week: 6.0 standard drinks     Types: 6 Cans of beer per week     Comment: Monthly   • Drug use: Never   • Sexual activity: Yes     Partners: Male     Birth control/protection: Natural family planning, Partner's vasectomy       Vital Signs:   Resp 16   Ht 167.6 cm (66\")   Wt 63.5 kg (140 lb)   BMI 22.60 kg/m²      Physical Exam     Result Review :   The following data was reviewed by: GIOVANNI Alas on 09/19/2022:  Results for orders placed or performed in visit on 09/19/22   Bladder Scan   Result Value Ref Range    Urine Volume 142ml    POC Urinalysis Dipstick, Automated    Specimen: Urine   Result Value Ref Range    Color Yellow Yellow, Straw, Dark Yellow, Radhika    Clarity, UA Clear Clear    Specific Gravity  1.015 1.005 - 1.030    pH, Urine 7.0 5.0 - 8.0    Leukocytes Negative Negative    Nitrite, UA Negative Negative    Protein, POC Negative Negative mg/dL    Glucose, UA Negative Negative mg/dL    Ketones, UA Negative Negative    Urobilinogen, UA 0.2 E.U./dL Normal, 0.2 E.U./dL    Bilirubin Negative Negative    Blood, UA Negative " Negative    Lot Number [id number]     Expiration Date 82,023         Bladder Scan interpretation 09/19/2022    Estimation of residual urine via "[employer] an [employer]"I 3000 VerOcapion Bladder Scan  MA/nurse performing: Isabel VELIZ MA   Residual Urine: 142 ml  Indication: Recurrent UTI   Position: Supine  Examination: Incremental scanning of the suprapubic area using 2.0 MHz transducer using copious amounts of acoustic gel.   Findings: An anechoic area was demonstrated which represented the bladder, with measurement of residual urine as noted. I inspected this myself. In that the residual urine was insignificant, refer to plan for treatment and plan necessary at this time.           Procedures        Assessment and Plan    Diagnoses and all orders for this visit:    1. Recurrent UTI (Primary)  -     Bladder Scan  -     POC Urinalysis Dipstick, Automated  -     Urine Culture - Urine, Urine, Clean Catch; Future  -     tamsulosin (FLOMAX) 0.4 MG capsule 24 hr capsule; Take 1 capsule by mouth Daily for 30 days.  Dispense: 90 capsule; Refill: 3    Urinary tract infection-no evidence on urine dip of infection today.  Encouraged behavioral modifications including fluid management, hydration, not delaying urgency when she needs to void.  Recommend cranberry supplementation daily to aid with prevention of urinary tract infection.  Patient provided specimen cup and asked to present to office to provide specimen should she have symptoms of urinary tract infection to submit for urine culture.  Discussed with patient the importance of obtaining urine culture prior to treatment with antibiotics for urinary tract infection.  Follow-up in 6 weeks     Recommend vaginal probiotics to promote good jeanne and decrease colonization of inflammatory bacteria; also discussed birth control methods given cyclic nature of UTI monthly in conjunction with her menses; UTI symptoms likely hormonally regulated.        Discussed with patient that chronic recurrent UTI is a  common condition that is multifactorial in nature, difficult to treat, unlikely to completely irradicate, and the specific cause for recurrent infections is often unknown.     Discussed with patient at this point in time given her symptoms I would like to try to place her on tamsulosin to see if this will help to alleviate some of the spraying with urination and help her to empty her bladder completely.        I spent 15 minutes caring for Patricia on this date of service. This time includes time spent by me in the following activities:reviewing tests, obtaining and/or reviewing a separately obtained history, performing a medically appropriate examination and/or evaluation , counseling and educating the patient/family/caregiver, ordering medications, tests, or procedures, and documenting information in the medical record  Follow Up   Return in about 6 weeks (around 10/31/2022) for With Dr. Stuart for f/u recurrent UTI with PVR check .  Patient was given instructions and counseling regarding her condition or for health maintenance advice. Please see specific information pulled into the AVS if appropriate.         This document has been electronically signed by GIOVANNI Alas  September 19, 2022 11:24 EDT

## 2022-09-20 LAB — BACTERIA SPEC AEROBE CULT: NO GROWTH

## 2022-11-09 ENCOUNTER — TELEPHONE (OUTPATIENT)
Dept: UROLOGY | Facility: CLINIC | Age: 32
End: 2022-11-09

## 2022-11-09 NOTE — TELEPHONE ENCOUNTER
PT CX'D HER APPT WITH DR AGUIRRE FOR 11/9, SPOKE TO PT AND SHE STATED THAT SHE DID NOT WANT TO RESCHEDULED HER APPT AT THIS TIME, ANYTHING ELSE TO DO?

## 2023-02-10 ENCOUNTER — TRANSCRIBE ORDERS (OUTPATIENT)
Dept: OBSTETRICS AND GYNECOLOGY | Facility: HOSPITAL | Age: 33
End: 2023-02-10
Payer: COMMERCIAL

## 2023-02-10 DIAGNOSIS — O09.299 HX OF PREECLAMPSIA, PRIOR PREGNANCY, CURRENTLY PREGNANT: ICD-10-CM

## 2023-02-10 DIAGNOSIS — O44.02 COMPLETE PLACENTA PREVIA NOS OR WITHOUT HEMORRHAGE, SECOND TRIMESTER: Primary | ICD-10-CM

## 2023-02-10 DIAGNOSIS — F41.8 MIXED ANXIETY DEPRESSIVE DISORDER: ICD-10-CM

## 2023-02-10 DIAGNOSIS — Z34.90 PREGNANCY, UNSPECIFIED GESTATIONAL AGE: ICD-10-CM

## 2023-02-23 ENCOUNTER — HOSPITAL ENCOUNTER (OUTPATIENT)
Dept: WOMENS IMAGING | Facility: HOSPITAL | Age: 33
Discharge: HOME | End: 2023-02-23
Admitting: NURSE PRACTITIONER
Payer: COMMERCIAL

## 2023-02-23 ENCOUNTER — OFFICE VISIT (OUTPATIENT)
Dept: OBSTETRICS AND GYNECOLOGY | Facility: HOSPITAL | Age: 33
End: 2023-02-23
Payer: COMMERCIAL

## 2023-02-23 VITALS
SYSTOLIC BLOOD PRESSURE: 113 MMHG | DIASTOLIC BLOOD PRESSURE: 71 MMHG | BODY MASS INDEX: 24.59 KG/M2 | HEIGHT: 66 IN | WEIGHT: 153 LBS

## 2023-02-23 DIAGNOSIS — G90.A POSTURAL ORTHOSTATIC TACHYCARDIA SYNDROME: ICD-10-CM

## 2023-02-23 DIAGNOSIS — O09.299 HX OF PREECLAMPSIA, PRIOR PREGNANCY, CURRENTLY PREGNANT: ICD-10-CM

## 2023-02-23 DIAGNOSIS — O44.00 PLACENTA PREVIA ANTEPARTUM: ICD-10-CM

## 2023-02-23 DIAGNOSIS — Z3A.22 22 WEEKS GESTATION OF PREGNANCY: ICD-10-CM

## 2023-02-23 DIAGNOSIS — Z34.90 PREGNANCY, UNSPECIFIED GESTATIONAL AGE: ICD-10-CM

## 2023-02-23 DIAGNOSIS — G90.9 DISORDER OF AUTONOMIC NERVOUS SYSTEM: ICD-10-CM

## 2023-02-23 DIAGNOSIS — F41.8 MIXED ANXIETY DEPRESSIVE DISORDER: ICD-10-CM

## 2023-02-23 DIAGNOSIS — O44.02 COMPLETE PLACENTA PREVIA NOS OR WITHOUT HEMORRHAGE, SECOND TRIMESTER: ICD-10-CM

## 2023-02-23 DIAGNOSIS — D49.7 PITUITARY NEOPLASM: Primary | ICD-10-CM

## 2023-02-23 DIAGNOSIS — Q79.62 EHLERS-DANLOS SYNDROME TYPE III: ICD-10-CM

## 2023-02-23 PROCEDURE — 76811 OB US DETAILED SNGL FETUS: CPT

## 2023-02-23 PROCEDURE — 76811 OB US DETAILED SNGL FETUS: CPT | Performed by: OBSTETRICS & GYNECOLOGY

## 2023-02-23 RX ORDER — PRENATAL VIT/IRON FUM/FOLIC AC 27MG-0.8MG
1 TABLET ORAL DAILY
COMMUNITY

## 2023-02-23 RX ORDER — NITROFURANTOIN 25; 75 MG/1; MG/1
CAPSULE ORAL NIGHTLY
COMMUNITY
Start: 2023-01-31

## 2023-02-23 NOTE — PROGRESS NOTES
Denies problems. Reports had NIPS with normal results. Next OB visit is 3/9/23. Reports previous concern for Pravin-Danlos but she has not been tested. She understands that she is here for consult regarding that concern and POTS.

## 2023-02-23 NOTE — PROGRESS NOTES
Patient seen in Maternal Fetal Medicine clinic today. Please see full note in under imaging tab of patient chart in Epic (Viewpoint report).    Shobha Perdue MD

## 2023-02-27 PROBLEM — O44.00 PLACENTA PREVIA ANTEPARTUM: Status: ACTIVE | Noted: 2023-02-27

## 2023-02-27 PROBLEM — O09.299 HX OF PREECLAMPSIA, PRIOR PREGNANCY, CURRENTLY PREGNANT: Status: ACTIVE | Noted: 2023-02-27

## 2023-03-13 ENCOUNTER — TELEPHONE (OUTPATIENT)
Dept: OBSTETRICS AND GYNECOLOGY | Facility: HOSPITAL | Age: 33
End: 2023-03-13
Payer: COMMERCIAL

## 2023-03-13 NOTE — TELEPHONE ENCOUNTER
PT called to see if it was ok for her to fly since she has a complete placenta previa.  Spoke to Dr. Resendiz, and he advised that PT should be ok to fly if she is not currently having any spotting or cramping.  PT needs to drink plenty of water and be well  Hydrated, and needs to research the closest [facility] 3 hospital to her location in case she is to have any issues.  Advised PT of this and she verbalized understanding.

## 2023-04-06 ENCOUNTER — HOSPITAL ENCOUNTER (OUTPATIENT)
Dept: WOMENS IMAGING | Facility: HOSPITAL | Age: 33
Discharge: HOME | End: 2023-04-06
Admitting: OBSTETRICS & GYNECOLOGY
Payer: COMMERCIAL

## 2023-04-06 ENCOUNTER — OFFICE VISIT (OUTPATIENT)
Dept: OBSTETRICS AND GYNECOLOGY | Facility: HOSPITAL | Age: 33
End: 2023-04-06
Payer: COMMERCIAL

## 2023-04-06 VITALS — BODY MASS INDEX: 26.12 KG/M2 | SYSTOLIC BLOOD PRESSURE: 122 MMHG | WEIGHT: 159.4 LBS | DIASTOLIC BLOOD PRESSURE: 78 MMHG

## 2023-04-06 DIAGNOSIS — G90.A POSTURAL ORTHOSTATIC TACHYCARDIA SYNDROME: ICD-10-CM

## 2023-04-06 DIAGNOSIS — O44.00 PLACENTA PREVIA ANTEPARTUM: Primary | ICD-10-CM

## 2023-04-06 DIAGNOSIS — D49.7 PITUITARY NEOPLASM: ICD-10-CM

## 2023-04-06 DIAGNOSIS — Z34.90 PREGNANCY, UNSPECIFIED GESTATIONAL AGE: ICD-10-CM

## 2023-04-06 DIAGNOSIS — Q79.62 EHLERS-DANLOS SYNDROME TYPE III: ICD-10-CM

## 2023-04-06 DIAGNOSIS — G90.9 DISORDER OF AUTONOMIC NERVOUS SYSTEM: ICD-10-CM

## 2023-04-06 DIAGNOSIS — O44.00 PLACENTA PREVIA ANTEPARTUM: ICD-10-CM

## 2023-04-06 PROCEDURE — 76816 OB US FOLLOW-UP PER FETUS: CPT | Performed by: OBSTETRICS & GYNECOLOGY

## 2023-04-06 PROCEDURE — 76816 OB US FOLLOW-UP PER FETUS: CPT

## 2023-04-06 NOTE — ASSESSMENT & PLAN NOTE
Patient presents for follow-up of placenta previa noted previously.  Ultrasound today demonstrates a complete previa is still present.  Most likely this will not resolve prior to delivery.  We will scan again in 4 weeks in the off chance that the previa will resolve.  That does not resolve.  Recommend delivery at 36 to 37 weeks gestation prior to labor.  Patient believes that her obstetrician would prefer for her to deliver here in [place] we would be happy to arrange that by her obstetrician's office needs to directly inform us that he desires that.

## 2023-04-06 NOTE — LETTER
April 6, 2023       No Recipients    Patient: Patricia Olea   YOB: 1990   Date of Visit: 4/6/2023       Dear GIOVANNI Garcia,    Thank you for referring Patricia Olea to me for evaluation. Below is a copy of my consult note.    If you have questions, please do not hesitate to call me. I look forward to following Patricia along with you.         Sincerely,        Douglas A. Milligan, MD        CC:   No Recipients    No notes on file

## 2023-04-06 NOTE — PROGRESS NOTES
"Documentation of the ultrasound findings, images, and interpretations will be available in the patient's Viewpoint report which is located in the imaging tab in chart review.        Maternal/Fetal Medicine Follow Up  Note     Name: Patricia Olea    : 1990     MRN: [id number]     Referring Provider: GIOVANNI Garcia    Chief Complaint  No chief complaint on file.    Subjective     History of Present Illness:  Patricia Olea is a 33 y.o.  28w4d who presents today for placenta previa.    ASYA: Estimated Date of Delivery: 23     ROS:   As noted in HPI.     Objective     Vital Signs  /78   Wt 72.3 kg (159 lb 6.4 oz)   LMP 2022 (Exact Date)   Estimated body mass index is 26.12 kg/m² as calculated from the following:    Height as of 23: 166.4 cm (65.5\").    Weight as of this encounter: 72.3 kg (159 lb 6.4 oz).    Physical Exam    Ultrasound Impression:   See viewpoint  Previa present    Assessment and Plan     Patricia Olea is a 33 y.o.  28w4d who presents today for placenta preia.    Diagnoses and all orders for this visit:    1. Placenta previa antepartum (Primary)  Assessment & Plan:  Patient presents for follow-up of placenta previa noted previously.  Ultrasound today demonstrates a complete previa is still present.  Most likely this will not resolve prior to delivery.  We will scan again in 4 weeks in the off chance that the previa will resolve.  That does not resolve.  Recommend delivery at 36 to 37 weeks gestation prior to labor.  Patient believes that her obstetrician would prefer for her to deliver here in [place] we would be happy to arrange that by her obstetrician's office needs to directly inform us that he desires that.      2. Pregnancy, unspecified gestational age    3. Postural orthostatic tachycardia syndrome       Follow Up  No follow-ups on file.    I spent 10 minutes caring for the patient on the day of service. This included: " obtaining or reviewing a separately obtained medical history, reviewing patient records, performing a medically appropriate exam and/or evaluation, counseling or educating the patient/family/caregiver, ordering medications, labs, and/or procedures and documenting such in the medical record. This does not include time spent on review and interpretation of other tests such as fetal ultrasound or the performance of other procedures such as amniocentesis or CVS.      Douglas A. Milligan, MD  Maternal Fetal Medicine, [facility]     2023

## 2023-12-04 ENCOUNTER — OFFICE VISIT (OUTPATIENT)
Dept: UROLOGY | Facility: CLINIC | Age: 33
End: 2023-12-04
Payer: COMMERCIAL

## 2023-12-04 VITALS
WEIGHT: 145 LBS | HEART RATE: 89 BPM | DIASTOLIC BLOOD PRESSURE: 71 MMHG | HEIGHT: 66 IN | SYSTOLIC BLOOD PRESSURE: 105 MMHG | BODY MASS INDEX: 23.3 KG/M2 | RESPIRATION RATE: 16 BRPM

## 2023-12-04 DIAGNOSIS — N39.0 RECURRENT UTI: Primary | ICD-10-CM

## 2023-12-04 PROBLEM — A49.3 NONGONOCOCCAL URETHRITIS DUE TO UREAPLASMA UREALYTICUM: Status: ACTIVE | Noted: 2022-11-02

## 2023-12-04 PROBLEM — R87.619 ABNORMAL CERVICAL PAPANICOLAOU SMEAR: Status: ACTIVE | Noted: 2023-12-04

## 2023-12-04 PROBLEM — D35.2 PITUITARY MICROADENOMA: Status: ACTIVE | Noted: 2023-01-31

## 2023-12-04 PROBLEM — R30.0 DIFFICULT OR PAINFUL URINATION: Status: ACTIVE | Noted: 2023-01-20

## 2023-12-04 PROBLEM — O14.00 MILD PRE-ECLAMPSIA: Status: ACTIVE | Noted: 2023-12-04

## 2023-12-04 PROBLEM — R30.0 PAINFUL URGING TO URINATE: Status: ACTIVE | Noted: 2023-12-04

## 2023-12-04 PROBLEM — R00.2 PALPITATIONS: Status: ACTIVE | Noted: 2023-12-04

## 2023-12-04 PROBLEM — K59.00 CONSTIPATION: Status: ACTIVE | Noted: 2023-12-04

## 2023-12-04 PROBLEM — Z34.90 PREGNANCY: Status: ACTIVE | Noted: 2022-11-17

## 2023-12-04 PROBLEM — Z3A.01 LESS THAN 8 WEEKS GESTATION OF PREGNANCY: Status: ACTIVE | Noted: 2022-10-25

## 2023-12-04 PROBLEM — B37.31 CANDIDIASIS OF VAGINA: Status: RESOLVED | Noted: 2022-09-19 | Resolved: 2023-12-04

## 2023-12-04 PROBLEM — O23.40 RECURRENT URINARY TRACT INFECTION AFFECTING PREGNANCY, ANTEPARTUM: Status: ACTIVE | Noted: 2023-04-08

## 2023-12-04 PROBLEM — O44.13 COMPLETE PLACENTA PREVIA WITH HEMORRHAGE, THIRD TRIMESTER: Status: ACTIVE | Noted: 2023-04-08

## 2023-12-04 PROBLEM — N34.1 NONGONOCOCCAL URETHRITIS DUE TO UREAPLASMA UREALYTICUM: Status: ACTIVE | Noted: 2022-11-02

## 2023-12-04 LAB
BILIRUB BLD-MCNC: NEGATIVE MG/DL
CLARITY, POC: CLEAR
COLOR UR: YELLOW
EXPIRATION DATE: NORMAL
GLUCOSE UR STRIP-MCNC: NEGATIVE MG/DL
KETONES UR QL: NEGATIVE
LEUKOCYTE EST, POC: NEGATIVE
Lab: NORMAL
NITRITE UR-MCNC: NEGATIVE MG/ML
PH UR: 7 [PH] (ref 5–8)
PROT UR STRIP-MCNC: NEGATIVE MG/DL
RBC # UR STRIP: NEGATIVE /UL
SP GR UR: 1.02 (ref 1–1.03)
URINE VOLUME: 19
UROBILINOGEN UR QL: NORMAL

## 2023-12-04 PROCEDURE — 51798 US URINE CAPACITY MEASURE: CPT | Performed by: NURSE PRACTITIONER

## 2023-12-04 PROCEDURE — 99213 OFFICE O/P EST LOW 20 MIN: CPT | Performed by: NURSE PRACTITIONER

## 2023-12-04 PROCEDURE — 1160F RVW MEDS BY RX/DR IN RCRD: CPT | Performed by: NURSE PRACTITIONER

## 2023-12-04 PROCEDURE — 1159F MED LIST DOCD IN RCRD: CPT | Performed by: NURSE PRACTITIONER

## 2023-12-04 PROCEDURE — 3078F DIAST BP <80 MM HG: CPT | Performed by: NURSE PRACTITIONER

## 2023-12-04 PROCEDURE — 3074F SYST BP LT 130 MM HG: CPT | Performed by: NURSE PRACTITIONER

## 2023-12-04 NOTE — PROGRESS NOTES
Chief Complaint: Cystitis (States that she is with symptoms and is burning with urination today)    Subjective         History of Present Illness  Patricia Olea is a 33 y.o. female presents to [facility] to be seen for f/u recurrent UTIs.    At last visit we placed the patient on tamsulosin due to incomplete bladder emptying and she ended up getting pregnant soon after and never took this.     She states that 1 week after her menses she will have dysuria.    She states she was placed on uribel that helped symptoms x 3 months.    She has been getting tested at work and having urine tests sent out.     She states she had a Uti in 10/2023 that was e coli.     She states that Utis can start after intercourse.    She is not on a probiotic.     She is having dysuria today.      Previous:   She has seen a previous urologist and had a stone removed.     She states that she has a UTI every month around menses.     She states she has dysuria and feeling of incomplete emptying.     Denies straining to void.    She is on doxycyline for  UTI with staph within the last week.     She does have a HX of Bacterial vaginosis.     Not related to intercourse.    She had a CT scan done on 9/14/22 which reveals no  Stones or renal cysts. Large right adnexal 4.7x 4.1 cm simple cyst.     She does state some spraying with urination.     She drinks 48 oz of water a day.     She voids not very often.       Previous:  5/2/2022 no growth    10/24/2021 E. coli greater than 100,000 colony-forming units per milliliter pansensitive.    7/2/2021 no growth.    6/24/2021 E. coli greater than 100,000 colony-forming units per milliliter resistant to ampicillin, levofloxacin, intermittent susceptibility to ampicillin/sulbactam.    5/2/2021 E. coli greater than 100,000 colony-forming units per milliliter distant to ampicillin, intermittent susceptibility to ampicillin/sulbactam.    2/23/2021 E. coli greater than 100,000  colony-forming units per milliliter resistant to ampicillin, intermittent susceptibility to ampicillin/sulbactam.    2021 no growth.    2021 E. coli greater than 100,000 colony-forming units per milliliter pansensitive.    Objective     Past Medical History:   Diagnosis Date    Abnormal cervical Papanicolaou smear 2023    Anxiety     Asthma     Enlarged thyroid     Frequent UTI     Monthly for over 10 years    History of endometriosis     History of pre-eclampsia     ; 2016    Kidney stone     Pituitary microadenoma     dx 2014    POTS (postural orthostatic tachycardia syndrome)     Vaginal infection     Bacterial infections       Past Surgical History:   Procedure Laterality Date     SECTION  2023    DIAGNOSTIC LAPAROSCOPY      x5 for endometriosis    HERNIA REPAIR      Bilateral inguinal    KIDNEY STONE SURGERY      extraction and stent placement         Current Outpatient Medications:     citalopram (CeleXA) 10 MG tablet, citalopram 10 mg tablet, Disp: , Rfl:     nitrofurantoin, macrocrystal-monohydrate, (MACROBID) 100 MG capsule, Every Night. (Patient not taking: Reported on 2023), Disp: , Rfl:     Prenatal Vit-Fe Fumarate-FA (prenatal vitamin 27-0.8) 27-0.8 MG tablet tablet, Take 1 tablet by mouth Daily., Disp: , Rfl:     verapamil (CALAN) 40 MG tablet, Take 40 mg by mouth Daily., Disp: , Rfl:     Allergies   Allergen Reactions    Sulfamethoxazole-Trimethoprim Hives        Family History   Problem Relation Age of Onset    Kidney cancer Maternal Grandfather        Social History     Socioeconomic History    Marital status: Single   Tobacco Use    Smoking status: Never    Smokeless tobacco: Never   Vaping Use    Vaping Use: Never used   Substance and Sexual Activity    Alcohol use: Not Currently     Alcohol/week: 6.0 standard drinks of alcohol     Types: 6 Cans of beer per week     Comment: 0-1/ week when not pregnant    Drug use: Never    Sexual activity: Defer  "    Partners: Male     Birth control/protection: None       Vital Signs:   /71   Pulse 89   Resp 16   Ht 167.6 cm (66\")   Wt 65.8 kg (145 lb)   BMI 23.40 kg/m²      Physical Exam     Result Review :   The following data was reviewed by: GIOVANNI Alas on 12/4/2023:  Results for orders placed or performed in visit on 12/04/23   Bladder Scan   Result Value Ref Range    Urine Volume 19    POC Urinalysis Dipstick, Automated    Specimen: Urine   Result Value Ref Range    Color Yellow Yellow, Straw, Dark Yellow, Radhika    Clarity, UA Clear Clear    Specific Gravity  1.025 1.005 - 1.030    pH, Urine 7.0 5.0 - 8.0    Leukocytes Negative Negative    Nitrite, UA Negative Negative    Protein, POC Negative Negative mg/dL    Glucose, UA Negative Negative mg/dL    Ketones, UA Negative Negative    Urobilinogen, UA Normal Normal, 0.2 E.U./dL    Bilirubin Negative Negative    Blood, UA Negative Negative    Lot Number [id number]     Expiration Date 2,024/10         Bladder Scan interpretation 12/4/2023    Estimation of residual urine via [employer] 3000 Verathon Bladder Scan  MA/nurse performing: Isabel VELIZ MA   Residual Urine: 19 ml  Indication: Recurrent UTI   Position: Supine  Examination: Incremental scanning of the suprapubic area using 2.0 MHz transducer using copious amounts of acoustic gel.   Findings: An anechoic area was demonstrated which represented the bladder, with measurement of residual urine as noted. I inspected this myself. In that the residual urine was insignificant, refer to plan for treatment and plan necessary at this time.           Procedures        Assessment and Plan    Diagnoses and all orders for this visit:    1. Recurrent UTI (Primary)  -     POC Urinalysis Dipstick, Automated  -     Bladder Scan  -     CMV DNA, Quantitative, PCR; Future        Urinary tract infection-no evidence on urine dip of infection today.  Discussed with patient that chronic recurrent UTI is a common condition that is " multifactorial in nature, difficult to treat, unlikely to completely irradicate, and the specific cause for recurrent infections is often unknown.      Should antibiotic strategy for treatment and prevention of recurrent urinary tract infection be pursued, aware that trend of urine cultures needs to be performed.  She is agreeable and understands this.     Encouraged behavioral modifications including fluid management, hydration, not delaying urgency when she needs to void.  Recommend cranberry supplementation daily to aid with prevention of urinary tract infection.  Discussed over-the-counter herbal supplements for prevention of UTI including [place], pre-life.  Patient provided information on these products and encouraged to investigate further.     At this point, recommend the above and  when culture positive.  patient to notify office as UTI symptoms arise so culture may be obtained.  Patient to take Pyridium while awaiting culture result and aware that antibiotic will only be ordered if urine culture positive.      May consider postcoital prophylaxis if urine cultures return susceptible to nitrofurantoin.        I spent 15 minutes caring for Patricia on this date of service. This time includes time spent by me in the following activities:reviewing tests, obtaining and/or reviewing a separately obtained history, performing a medically appropriate examination and/or evaluation , counseling and educating the patient/family/caregiver, ordering medications, tests, or procedures, and documenting information in the medical record  Follow Up   Return in about 3 months (around 3/4/2024) for f/u utis.  Patient was given instructions and counseling regarding her condition or for health maintenance advice. Please see specific information pulled into the AVS if appropriate.         This document has been electronically signed by GIOVANNI Alas  December 4, 2023 13:03 EST

## 2023-12-08 ENCOUNTER — TELEPHONE (OUTPATIENT)
Dept: UROLOGY | Facility: CLINIC | Age: 33
End: 2023-12-08
Payer: COMMERCIAL

## 2023-12-08 NOTE — TELEPHONE ENCOUNTER
PT CALLED FOR URINE CULTURE RESULTS, I READ HER THE MY CHART MESSAGE THAT JARROD HAD SENT. SHE STATES THAT SHE CURRENTLY CANNOT ACCESS HER MY CHART.

## 2024-03-04 ENCOUNTER — TELEPHONE (OUTPATIENT)
Dept: UROLOGY | Facility: CLINIC | Age: 34
End: 2024-03-04

## 2024-03-05 NOTE — TELEPHONE ENCOUNTER
2ND CALL - CALLED PT TO OFFER R/S OF NO SHOWED APPT 3/4 W/ JARROD    NUMBER STATES IT'S NOT IN SERVICE.    NO ONE ELSE LISTED IN  [place]

## 2024-03-07 NOTE — TELEPHONE ENCOUNTER
3RD CALL - CALLED PT TO OFFER R/S OF NO SHOWED APPT 3/4 W/ JARROD    NUMBER STATES IT'S NOT IN SERVICE.    NO ONE ELSE LISTED IN  [place]    ANYTHING ELSE TO DO?

## 2024-04-01 ENCOUNTER — TELEPHONE (OUTPATIENT)
Dept: UROLOGY | Facility: CLINIC | Age: 34
End: 2024-04-01
Payer: COMMERCIAL

## 2024-04-01 NOTE — TELEPHONE ENCOUNTER
PT IS FAXING OVER A URINE CULTURE THAT SHE HAD DONE ELSEWHERE. ASKING THAT YOU REVIEW IT AND CALL HER. I HAVE UPDATED HER PHONE NUMBER AS WE COULD NOT REACH HER PREVIOUSLY.

## 2024-04-01 NOTE — TELEPHONE ENCOUNTER
Spoke to pt. Pt doesn't want your opinion on cultures.  She does want you to review them for your records.  Pt will refax those to us.  Pt also will make an appt to follow up.  I suggested pt bring all cultures with her to the next appt.  Pt is of understanding.

## 2024-04-02 ENCOUNTER — TELEPHONE (OUTPATIENT)
Dept: UROLOGY | Facility: CLINIC | Age: 34
End: 2024-04-02

## 2024-04-02 NOTE — TELEPHONE ENCOUNTER
PT CALLED BACK AND WE SCHEDULED HER AN APPT WITH JARROD. PT WANTED TO LET YOU KNOW THAT THE ANTIBIOTIC THAT SHE IS ON NOW IS NOT WORKING, SHE IS ON LEVOFLOXACIN 750. SHE RE-FAXED HER URINE CULTURES -[phone].

## 2024-04-02 NOTE — TELEPHONE ENCOUNTER
The [facility] received a fax that requires your attention. The document has been indexed to the patient’s chart for your review.      Reason for sending: NEEDS PROVIDER REVIEW    Documents Description: LABS    Name of Sender: [employer]    Date Indexed: 04/02/24

## 2024-04-02 NOTE — TELEPHONE ENCOUNTER
Spoke to pt.  Pt will finish medication.  Once finished pt will let us know IF she is still having symptoms.  We will then order a culture.  Pt is of understanding.

## 2024-04-02 NOTE — TELEPHONE ENCOUNTER
Provider: DR. JARROD MCCABE    [facility] received a fax that requires your attention. The document has been indexed to the patient's chart for your review.     Reason for sending: REVIEW     Documents Description: LAB/[facility] UA CULTURE     Name of Sender: [employer]     Date Indexed: 04/02/24

## 2024-05-07 ENCOUNTER — TELEPHONE (OUTPATIENT)
Dept: UROLOGY | Facility: CLINIC | Age: 34
End: 2024-05-07

## 2024-08-30 ENCOUNTER — TELEPHONE (OUTPATIENT)
Dept: UROLOGY | Facility: CLINIC | Age: 34
End: 2024-08-30
Payer: COMMERCIAL

## 2024-08-30 NOTE — TELEPHONE ENCOUNTER
PT NO SHOWED APPT 8/30 W/ JARROD    THIS IS PT'S 3RD NO SHOW IN A ROW, DO YOU WANT ME TO OFFER R/S?

## 2024-09-19 ENCOUNTER — PATIENT MESSAGE (OUTPATIENT)
Dept: UROLOGY | Facility: CLINIC | Age: 34
End: 2024-09-19
Payer: COMMERCIAL

## 2024-09-19 DIAGNOSIS — N20.0 RENAL STONE: ICD-10-CM

## 2024-09-19 DIAGNOSIS — N39.0 RECURRENT UTI: Primary | ICD-10-CM

## 2024-10-04 ENCOUNTER — TELEPHONE (OUTPATIENT)
Dept: SURGERY | Facility: CLINIC | Age: 34
End: 2024-10-04
Payer: COMMERCIAL

## 2024-10-04 NOTE — TELEPHONE ENCOUNTER
GOSIA GAN FROM [facility] CALLED STATES THE PA FOR THE CT ABD PELVIS IS GOING TO BE DENIED. STATES THEY NEEDED RECENT OFFICE NOTES, ABNORMAL PRIOR IMAGING, REASON FOR STUDY IN DOCUMENTATION STATED.     [phone]

## 2024-11-07 ENCOUNTER — OFFICE VISIT (OUTPATIENT)
Dept: UROLOGY | Age: 34
End: 2024-11-07
Payer: COMMERCIAL

## 2024-11-07 VITALS
HEART RATE: 87 BPM | OXYGEN SATURATION: 99 % | WEIGHT: 145 LBS | HEIGHT: 66 IN | BODY MASS INDEX: 23.3 KG/M2 | SYSTOLIC BLOOD PRESSURE: 122 MMHG | DIASTOLIC BLOOD PRESSURE: 82 MMHG

## 2024-11-07 DIAGNOSIS — N30.90 KLEBSIELLA CYSTITIS: ICD-10-CM

## 2024-11-07 DIAGNOSIS — B96.1 KLEBSIELLA CYSTITIS: ICD-10-CM

## 2024-11-07 DIAGNOSIS — N39.0 RECURRENT UTI: Primary | ICD-10-CM

## 2024-11-07 DIAGNOSIS — M62.89 PELVIC FLOOR DYSFUNCTION: ICD-10-CM

## 2024-11-07 PROBLEM — O44.13 COMPLETE PLACENTA PREVIA WITH HEMORRHAGE, THIRD TRIMESTER: Status: RESOLVED | Noted: 2023-04-08 | Resolved: 2024-11-07

## 2024-11-07 PROBLEM — R42 DISEQUILIBRIUM: Status: RESOLVED | Noted: 2018-06-22 | Resolved: 2024-11-07

## 2024-11-07 PROBLEM — R30.0 PAINFUL URGING TO URINATE: Status: RESOLVED | Noted: 2023-12-04 | Resolved: 2024-11-07

## 2024-11-07 PROBLEM — O44.00 PLACENTA PREVIA ANTEPARTUM: Status: RESOLVED | Noted: 2023-02-27 | Resolved: 2024-11-07

## 2024-11-07 PROBLEM — A49.3 NONGONOCOCCAL URETHRITIS DUE TO UREAPLASMA UREALYTICUM: Status: RESOLVED | Noted: 2022-11-02 | Resolved: 2024-11-07

## 2024-11-07 PROBLEM — G90.9 DISORDER OF AUTONOMIC NERVOUS SYSTEM: Status: RESOLVED | Noted: 2018-06-22 | Resolved: 2024-11-07

## 2024-11-07 PROBLEM — Z3A.01 LESS THAN 8 WEEKS GESTATION OF PREGNANCY: Status: RESOLVED | Noted: 2022-10-25 | Resolved: 2024-11-07

## 2024-11-07 PROBLEM — R06.02 SHORTNESS OF BREATH: Status: RESOLVED | Noted: 2018-08-31 | Resolved: 2024-11-07

## 2024-11-07 PROBLEM — R00.0 SINUS TACHYCARDIA: Status: RESOLVED | Noted: 2018-06-22 | Resolved: 2024-11-07

## 2024-11-07 PROBLEM — O23.40 RECURRENT URINARY TRACT INFECTION AFFECTING PREGNANCY, ANTEPARTUM: Status: RESOLVED | Noted: 2023-04-08 | Resolved: 2024-11-07

## 2024-11-07 PROBLEM — N76.0 ACUTE VAGINITIS: Status: RESOLVED | Noted: 2022-09-19 | Resolved: 2024-11-07

## 2024-11-07 PROBLEM — R07.9 CHEST PAIN: Status: RESOLVED | Noted: 2018-08-31 | Resolved: 2024-11-07

## 2024-11-07 PROBLEM — Z34.90 PREGNANCY: Status: RESOLVED | Noted: 2022-11-17 | Resolved: 2024-11-07

## 2024-11-07 PROBLEM — R87.619 ABNORMAL CERVICAL PAPANICOLAOU SMEAR: Status: RESOLVED | Noted: 2023-12-04 | Resolved: 2024-11-07

## 2024-11-07 PROBLEM — O14.00 MILD PRE-ECLAMPSIA: Status: RESOLVED | Noted: 2023-12-04 | Resolved: 2024-11-07

## 2024-11-07 PROBLEM — O09.299 HX OF PREECLAMPSIA, PRIOR PREGNANCY, CURRENTLY PREGNANT: Status: RESOLVED | Noted: 2023-02-27 | Resolved: 2024-11-07

## 2024-11-07 PROBLEM — R10.11 RIGHT UPPER QUADRANT PAIN: Status: RESOLVED | Noted: 2022-09-19 | Resolved: 2024-11-07

## 2024-11-07 PROBLEM — Z71.2 ENCOUNTER TO DISCUSS TEST RESULTS: Status: RESOLVED | Noted: 2018-08-31 | Resolved: 2024-11-07

## 2024-11-07 PROBLEM — IMO0002 ENDOMETRIOSIS OF PELVIS: Status: RESOLVED | Noted: 2022-09-19 | Resolved: 2024-11-07

## 2024-11-07 PROBLEM — N34.1 NONGONOCOCCAL URETHRITIS DUE TO UREAPLASMA UREALYTICUM: Status: RESOLVED | Noted: 2022-11-02 | Resolved: 2024-11-07

## 2024-11-07 PROBLEM — K59.00 CONSTIPATION: Status: RESOLVED | Noted: 2023-12-04 | Resolved: 2024-11-07

## 2024-11-07 PROBLEM — R30.0 DIFFICULT OR PAINFUL URINATION: Status: RESOLVED | Noted: 2023-01-20 | Resolved: 2024-11-07

## 2024-11-07 LAB
BILIRUB BLD-MCNC: NEGATIVE MG/DL
CLARITY, POC: CLEAR
COLOR UR: YELLOW
EXPIRATION DATE: ABNORMAL
GLUCOSE UR STRIP-MCNC: NEGATIVE MG/DL
KETONES UR QL: NEGATIVE
LEUKOCYTE EST, POC: ABNORMAL
Lab: ABNORMAL
NITRITE UR-MCNC: NEGATIVE MG/ML
PH UR: 5.5 [PH] (ref 5–8)
PROT UR STRIP-MCNC: NEGATIVE MG/DL
RBC # UR STRIP: NEGATIVE /UL
SP GR UR: 1.01 (ref 1–1.03)
URINE VOLUME: 0
UROBILINOGEN UR QL: NORMAL

## 2024-11-07 NOTE — PROGRESS NOTES
"Chief Complaint: Recurrent UTI    Subjective         History of Present Illness  Patricia Olea is a 34 y.o. female presents to [facility] to be seen for recurrent urinary tract infection.    She states she tests her urine every day at work .    She constantly has the feeling of a UTI.     She states she has constant pressure.     She states if she \"clenches up\" this goes away but if she releases and relaxes there is a vaginal pressure and she states she constantly feels like she has to void.     She has a feeling of incomplete bladder emptying.        Urine cultures:  2024 Klebsiella aerogenous greater than 100,000 coliform units per milliliter resistant to Augmentin, cefazolin, intermediate susceptibility to imipenem.    3/26/2024 Klebsiella aerogenous greater than 100,000 coliform units per milliliter resistant to Augmentin, cefazolin intermediate susceptibility to nitrofurantoin.    1/10/2023 Klebsiella pneumoniae greater than 100,000 colony-forming units per milliliter resistant to ampicillin.    Objective     Past Medical History:   Diagnosis Date    Abnormal cervical Papanicolaou smear 2023    Anxiety     Asthma     Chronic kidney disease     Enlarged thyroid     Fibroid     Frequent UTI     Monthly for over 10 years    History of endometriosis     History of pre-eclampsia     2014; 2016    Interstitial cystitis 2009    Kidney stone     Pituitary microadenoma     dx 2014    POTS (postural orthostatic tachycardia syndrome)     Vaginal infection     Bacterial infections       Past Surgical History:   Procedure Laterality Date     SECTION  2023     SECTION      DIAGNOSTIC LAPAROSCOPY      x5 for endometriosis    HERNIA REPAIR  2013    Bilateral inguinal    KIDNEY STONE SURGERY      extraction and stent placement         Current Outpatient Medications:     amitriptyline (ELAVIL) 25 MG tablet, , Disp: , Rfl:     citalopram (CeleXA) 10 " "MG tablet, citalopram 10 mg tablet, Disp: , Rfl:     nitrofurantoin, macrocrystal-monohydrate, (MACROBID) 100 MG capsule, Every Night., Disp: , Rfl:     verapamil (CALAN) 40 MG tablet, Take 1 tablet by mouth Daily., Disp: , Rfl:     Allergies   Allergen Reactions    Sulfamethoxazole-Trimethoprim Hives        Family History   Problem Relation Age of Onset    Kidney cancer Maternal Grandfather        Social History     Socioeconomic History    Marital status: Single   Tobacco Use    Smoking status: Never    Smokeless tobacco: Never   Vaping Use    Vaping status: Never Used   Substance and Sexual Activity    Alcohol use: Not Currently     Alcohol/week: 6.0 standard drinks of alcohol     Types: 6 Cans of beer per week     Comment: Monthly    Drug use: Never    Sexual activity: Yes     Partners: Male     Birth control/protection: Other, None, Partner of same sex       Vital Signs:   /82   Pulse 87   Ht 167.6 cm (66\")   Wt 65.8 kg (145 lb)   SpO2 99%   BMI 23.40 kg/m²      Physical Exam  Genitourinary:     Comments: Spasm and tenderness to palpation with the bilateral levator ani.  Palpation of the obturator internus reproducing subjective \"pressure and urge to void\"    Negative Q-tip test externally.  No clitoral phimosis.             Result Review :   The following data was reviewed by: GIOVANNI Alas on 11/07/2024:  Results for orders placed or performed in visit on 11/07/24   Bladder Scan    Collection Time: 11/07/24  8:55 AM   Result Value Ref Range    Urine Volume 0    POC Urinalysis Dipstick, Automated    Collection Time: 11/07/24  8:59 AM    Specimen: Urine   Result Value Ref Range    Color Yellow Yellow, Straw, Dark Yellow, Radhika    Clarity, UA Clear Clear    Specific Gravity  1.015 1.005 - 1.030    pH, Urine 5.5 5.0 - 8.0    Leukocytes Small (1+) (A) Negative    Nitrite, UA Negative Negative    Protein, POC Negative Negative mg/dL    Glucose, UA Negative Negative mg/dL    Ketones, UA Negative " Negative    Urobilinogen, UA Normal Normal, 0.2 E.U./dL    Bilirubin Negative Negative    Blood, UA Negative Negative    Lot Number -     Expiration Date -         Bladder Scan interpretation 11/07/2024    Estimation of residual urine via [employer] 3000 Verathon Bladder Scan  MA/nurse performing: weston tobar Rn   Residual Urine: 0 ml  Indication: Recurrent UTI    Klebsiella cystitis    Pelvic floor dysfunction   Position: Supine  Examination: Incremental scanning of the suprapubic area using 2.0 MHz transducer using copious amounts of acoustic gel.   Findings: An anechoic area was demonstrated which represented the bladder, with measurement of residual urine as noted. I inspected this myself. In that the residual urine was stable or insignificant, refer to plan for treatment and plan necessary at this time.          Procedures        Assessment and Plan    Diagnoses and all orders for this visit:    1. Recurrent UTI (Primary)  -     POC Urinalysis Dipstick, Automated  -     Bladder Scan  -     Pathnostics Guidance UTI -; Future  -     CT Abdomen Pelvis With & Without Contrast; Future    2. Klebsiella cystitis  -     CT Abdomen Pelvis With & Without Contrast; Future    3. Pelvic floor dysfunction  -     Ambulatory Referral to Physical Therapy for Evaluation & Treatment      Given patient's physical exam as well as her complaints we will go ahead and proceed with referral to pelvic floor physical therapy.    In regards to patient's recurrent Klebsiella UTIs do recommend proceeding with at least upper urinary tract evaluation to rule out renal stone as a possible cause of her recurrent infections.    Will order a CT scan of the abdomen and pelvis with and without contrast with delayed imaging to evaluate upper urinary tract for possible renal stone or pathology contributing to recurrent UTIs.    Will send PCR culture today.  Next  She will call the office with any signs or symptoms of UTI for an outpatient urine  culture.    Will plan for a follow-up appointment in 3 months or sooner if needed.    I spent 10 minutes caring for Patricia on this date of service. This time includes time spent by me in the following activities:reviewing tests, obtaining and/or reviewing a separately obtained history, performing a medically appropriate examination and/or evaluation , counseling and educating the patient/family/caregiver, ordering medications, tests, or procedures, and documenting information in the medical record  Follow Up   No follow-ups on file.  Patient was given instructions and counseling regarding her condition or for health maintenance advice. Please see specific information pulled into the AVS if appropriate.         This document has been electronically signed by GIOVANNI Alas  November 7, 2024 16:29 EST

## 2025-03-13 ENCOUNTER — TELEPHONE (OUTPATIENT)
Dept: UROLOGY | Age: 35
End: 2025-03-13
Payer: COMMERCIAL

## 2025-03-13 NOTE — TELEPHONE ENCOUNTER
MATH WITH PATHNOSTICS IS ASKING FOR A CALL BACK. HE HAS A QUESTION ON THE ORDER.  [phone] OPTION 3

## 2025-03-20 ENCOUNTER — HOSPITAL ENCOUNTER (OUTPATIENT)
Dept: CT IMAGING | Facility: HOSPITAL | Age: 35
Discharge: HOME | End: 2025-03-20
Admitting: NURSE PRACTITIONER
Payer: COMMERCIAL

## 2025-03-20 DIAGNOSIS — N30.90 KLEBSIELLA CYSTITIS: ICD-10-CM

## 2025-03-20 DIAGNOSIS — N39.0 RECURRENT UTI: ICD-10-CM

## 2025-03-20 DIAGNOSIS — B96.1 KLEBSIELLA CYSTITIS: ICD-10-CM

## 2025-03-20 PROCEDURE — 25510000001 IOPAMIDOL PER 1 ML: Performed by: NURSE PRACTITIONER

## 2025-03-20 PROCEDURE — 74178 CT ABD&PLV WO CNTR FLWD CNTR: CPT

## 2025-03-20 RX ORDER — IOPAMIDOL 755 MG/ML
100 INJECTION, SOLUTION INTRAVASCULAR
Status: COMPLETED | OUTPATIENT
Start: 2025-03-20 | End: 2025-03-20

## 2025-03-20 RX ADMIN — IOPAMIDOL 100 ML: 755 INJECTION, SOLUTION INTRAVENOUS at 09:59

## 2025-03-27 ENCOUNTER — OFFICE VISIT (OUTPATIENT)
Dept: UROLOGY | Age: 35
End: 2025-03-27
Payer: COMMERCIAL

## 2025-03-27 VITALS — WEIGHT: 143 LBS | BODY MASS INDEX: 22.98 KG/M2 | HEIGHT: 66 IN

## 2025-03-27 DIAGNOSIS — M62.89 PELVIC FLOOR DYSFUNCTION: ICD-10-CM

## 2025-03-27 DIAGNOSIS — N30.10 INTERSTITIAL CYSTITIS: ICD-10-CM

## 2025-03-27 DIAGNOSIS — N39.0 RECURRENT UTI: Primary | ICD-10-CM

## 2025-03-27 LAB
BILIRUB BLD-MCNC: NEGATIVE MG/DL
CLARITY, POC: CLEAR
COLOR UR: YELLOW
EXPIRATION DATE: ABNORMAL
GLUCOSE UR STRIP-MCNC: NEGATIVE MG/DL
KETONES UR QL: ABNORMAL
LEUKOCYTE EST, POC: ABNORMAL
Lab: ABNORMAL
NITRITE UR-MCNC: NEGATIVE MG/ML
PH UR: 5.5 [PH] (ref 5–8)
PROT UR STRIP-MCNC: NEGATIVE MG/DL
RBC # UR STRIP: ABNORMAL /UL
SP GR UR: 1.01 (ref 1–1.03)
UROBILINOGEN UR QL: ABNORMAL

## 2025-03-27 RX ORDER — ESTRADIOL 0.1 MG/G
CREAM VAGINAL
Qty: 42.5 G | Refills: 6 | Status: SHIPPED | OUTPATIENT
Start: 2025-03-27

## 2025-03-27 RX ORDER — HYDROXYZINE HYDROCHLORIDE 10 MG/1
10 TABLET, FILM COATED ORAL 3 TIMES DAILY PRN
Qty: 90 TABLET | Refills: 3 | Status: SHIPPED | OUTPATIENT
Start: 2025-03-27

## 2025-03-27 NOTE — PROGRESS NOTES
"Chief Complaint: Follow-up (Patient presents today for a follow up on recurrent UTI, she denies any urinary symptoms today. She states that she did a pathnostics that was sent to her house and she is curious on if the results are back. )    Subjective         History of Present Illness  Patricia Olea is a 35 y.o. female presents to [facility] to be seen for recurrent urinary tract infection.      PCR test from 3/19/2025 was negative for any pathologic DNA.    PCR culture from 11/9/24 negative for any bacterial growth.    She states that every months she feels as if she has a Uti and is sending a culture monthly that is negative.     She has been unable to get to [facility] due to insurance constraints.     She states symptoms are burning and urinary urgency and frequency with a feeling of incomplete emptying.    These symptoms are always around her menses.    She has been taking a women's health probiotic by uro.    She is drinking a coffee daily and mostly water.     She does use pink lemonade packets at times.    Lemonade can flare symptoms as well.            Previous:    She states she tests her urine every day at work .    She constantly has the feeling of a UTI.     She states she has constant pressure.     She states if she \"clenches up\" this goes away but if she releases and relaxes there is a vaginal pressure and she states she constantly feels like she has to void.     She has a feeling of incomplete bladder emptying.        Urine cultures:  9/13/2024 Klebsiella aerogenous greater than 100,000 coliform units per milliliter resistant to Augmentin, cefazolin, intermediate susceptibility to imipenem.    3/26/2024 Klebsiella aerogenous greater than 100,000 coliform units per milliliter resistant to Augmentin, cefazolin intermediate susceptibility to nitrofurantoin.    1/10/2023 Klebsiella pneumoniae greater than 100,000 colony-forming units per milliliter resistant to " ampicillin.    Objective     Past Medical History:   Diagnosis Date    Abnormal cervical Papanicolaou smear 2023    Anxiety     Asthma     Chronic kidney disease     Enlarged thyroid     Fibroid     Frequent UTI     Monthly for over 10 years    History of endometriosis     History of pre-eclampsia     ; 2016    Interstitial cystitis 2009    Kidney stone     Pituitary microadenoma     dx 2014    POTS (postural orthostatic tachycardia syndrome)     Vaginal infection     Bacterial infections       Past Surgical History:   Procedure Laterality Date     SECTION  2023     SECTION      DIAGNOSTIC LAPAROSCOPY      x5 for endometriosis    HERNIA REPAIR  2013    Bilateral inguinal    KIDNEY STONE SURGERY      extraction and stent placement         Current Outpatient Medications:     citalopram (CeleXA) 10 MG tablet, citalopram 10 mg tablet, Disp: , Rfl:     verapamil (CALAN) 40 MG tablet, Take 1 tablet by mouth Daily., Disp: , Rfl:     estradiol (ESTRACE) 0.1 MG/GM vaginal cream, Apply 0.5gm  inside the vagina and rub in and 0.5gm around the vestibule and massage and as well as around urethra 3x weekly before menses and after, Disp: 42.5 g, Rfl: 6    hydrOXYzine (ATARAX) 10 MG tablet, Take 1 tablet by mouth 3 (Three) Times a Day As Needed (bladder pain)., Disp: 90 tablet, Rfl: 3    Allergies   Allergen Reactions    Sulfamethoxazole-Trimethoprim Hives        Family History   Problem Relation Age of Onset    Kidney cancer Maternal Grandfather        Social History     Socioeconomic History    Marital status: Single   Tobacco Use    Smoking status: Never    Smokeless tobacco: Never   Vaping Use    Vaping status: Never Used   Substance and Sexual Activity    Alcohol use: Not Currently     Alcohol/week: 6.0 standard drinks of alcohol     Types: 6 Cans of beer per week     Comment: Monthly    Drug use: Never    Sexual activity: Yes     Partners: Male     Birth control/protection:  "Other, None, Partner of same sex       Vital Signs:   Ht 167.6 cm (66\")   Wt 64.9 kg (143 lb)   BMI 23.08 kg/m²      Physical Exam  Genitourinary:     Comments:            Result Review :   The following data was reviewed by: GIOVANNI Alas on 03/27/2025:  Results for orders placed or performed in visit on 03/27/25   POC Urinalysis Dipstick, Automated    Collection Time: 03/27/25  9:21 AM    Specimen: Urine   Result Value Ref Range    Color Yellow Yellow, Straw, Dark Yellow, Radhika    Clarity, UA Clear Clear    Specific Gravity  1.015 1.005 - 1.030    pH, Urine 5.5 5.0 - 8.0    Leukocytes Trace (A) Negative    Nitrite, UA Negative Negative    Protein, POC Negative Negative mg/dL    Glucose, UA Negative Negative mg/dL    Ketones, UA Trace (A) Negative    Urobilinogen, UA 0.2 E.U./dL Normal, 0.2 E.U./dL    Bilirubin Negative Negative    Blood, UA Moderate (A) Negative    Lot Number [id number]     Expiration Date 3/2,026         Bladder Scan interpretation 11/07/2024    Estimation of residual urine via TrivialaI 3000 Verathon Bladder Scan  MA/nurse performing: weston tobar Rn   Residual Urine: 0 ml  Indication: Recurrent UTI    Pelvic floor dysfunction    Interstitial cystitis   Position: Supine  Examination: Incremental scanning of the suprapubic area using 2.0 MHz transducer using copious amounts of acoustic gel.   Findings: An anechoic area was demonstrated which represented the bladder, with measurement of residual urine as noted. I inspected this myself. In that the residual urine was stable or insignificant, refer to plan for treatment and plan necessary at this time.          CT ABDOMEN PELVIS W WO CONTRAST     Date of Exam: 3/20/2025 9:55 AM EDT     Indication: recurrent klebsiella infeciton. Pelvic pressure, frequent UTIs.     Comparison: None available.     Technique: Axial CT images were obtained of the abdomen and pelvis before and after the uneventful intravenous administration of iodinated contrast. Sagittal " and coronal reconstructions were performed.  Automated exposure control and iterative   reconstruction methods were used.        Findings:  Lung Bases:    The visualized lung bases and lower mediastinal structures are unremarkable.        Liver:Liver is normal in size and CT density. Tiny cyst in the dome of the liver        Biliary/Gallbladder: The gallbladder is without evidence of radiopaque stones. The biliary tree is nondilated.        Spleen:Spleen is normal in size and CT density.     Pancreas:   Pancreas shows homogeneous density. There is no evidence of pancreatic mass or peripancreatic fluid.        Kidneys: Kidneys are normal in size. There are no stones or hydronephrosis.        Adrenals: Adrenal glands are unremarkable.        Retroperitoneal/Lymph Nodes/Vasculature: No retroperitoneal adenopathy is identified by size criteria.        Gastrointestinal/Mesentery: The bowel loops are non-dilated without definite wall thickening or mass. The appendix appears within normal limits. No evidence of obstruction. No free air.        Bladder: The bladder is unremarkable     No acute abnormalities in the pelvis. The uterus is anteverted. Right ovarian follicle measuring 1.7 cm     Bony Structures:  Visualized bony structures are consistent with the patient's age.           IMPRESSION:  Impression:  1.No acute findings in the abdomen or pelvis.           Electronically Signed: Jeff Shay MD    3/21/2025 12:46 PM EDT    Workstation ID: [id number]          Procedures        Assessment and Plan    Diagnoses and all orders for this visit:    1. Recurrent UTI (Primary)  -     POC Urinalysis Dipstick, Automated  -     estradiol (ESTRACE) 0.1 MG/GM vaginal cream; Apply 0.5gm  inside the vagina and rub in and 0.5gm around the vestibule and massage and as well as around urethra 3x weekly before menses and after  Dispense: 42.5 g; Refill: 6    2. Pelvic floor dysfunction    3. Interstitial cystitis  -     hydrOXYzine  (ATARAX) 10 MG tablet; Take 1 tablet by mouth 3 (Three) Times a Day As Needed (bladder pain).  Dispense: 90 tablet; Refill: 3      Discussed her symptoms seem to be hormone mediated in nature. Patient agreeable to a trial of estrace the week before her menses and after. Discussed this is used to the external tissues and to be applied only to the lower 1/3 of the vagina and external tissues as to avoid  ovarian uterine stimulation. Blackbox warning discussed with patient.    Did recommend trying to seek out a PFPT that will accept her insurance to at least have an evaluation to confirm or refusr dx of high tone pelvic floor/ pelvic dysfunction.     We discussed trigger foods  for IC including citrus, drinks containing citric acid, potassium, caffeine and artificial sweeteners and trying to avoid this or using prelief before ingestion to mitigate flare symptoms.     She is agreeable to trial hydroxyzine for IC symptoms discussed side effect of drowsiness is common for this medication and if this is not tolerated then to call the office for further treatment suggestions    I spent 10 minutes caring for Patricia on this date of service. This time includes time spent by me in the following activities:reviewing tests, obtaining and/or reviewing a separately obtained history, performing a medically appropriate examination and/or evaluation , counseling and educating the patient/family/caregiver, ordering medications, tests, or procedures, and documenting information in the medical record  Follow Up   Return in about 3 months (around 6/27/2025) for f/u utis/cystitis.  Patient was given instructions and counseling regarding her condition or for health maintenance advice. Please see specific information pulled into the AVS if appropriate.         This document has been electronically signed by GIOVANNI Alas  March 27, 2025 19:07 EDT

## 2025-07-09 ENCOUNTER — TELEPHONE (OUTPATIENT)
Dept: UROLOGY | Age: 35
End: 2025-07-09
Payer: COMMERCIAL

## 2025-07-09 NOTE — TELEPHONE ENCOUNTER
CALLED PT TO RS CX APPT FROM 6/26 W/JARROD/NORMA W/PT AND PT SAID THAT SHE IS NOT HAVING ISSUES RIGHT NOW AND DOES NOT WANT TO RS APPT/ANYTHING ELSE TO DO??

## 2025-08-27 DIAGNOSIS — N39.0 RECURRENT UTI: Primary | ICD-10-CM
